# Patient Record
Sex: FEMALE | URBAN - METROPOLITAN AREA
[De-identification: names, ages, dates, MRNs, and addresses within clinical notes are randomized per-mention and may not be internally consistent; named-entity substitution may affect disease eponyms.]

---

## 2024-06-11 LAB
ABO, EXTERNAL RESULT: NORMAL
C. TRACHOMATIS, EXTERNAL RESULT: NEGATIVE
HEPATITIS C ANTIBODY, EXTERNAL RESULT: NON REACTIVE
HIV, EXTERNAL RESULT: NON REACTIVE
N. GONORRHOEAE, EXTERNAL RESULT: NEGATIVE
RH FACTOR, EXTERNAL RESULT: NEGATIVE
RPR, EXTERNAL RESULT: NON REACTIVE
T. PALLIDUM (SYPHILIS) ANTIBODY, EXTERNAL RESULT: NON REACTIVE

## 2024-06-13 ENCOUNTER — TELEPHONE (OUTPATIENT)
Dept: OBGYN CLINIC | Age: 24
End: 2024-06-13

## 2024-06-13 NOTE — TELEPHONE ENCOUNTER
Patient called desiring to transfer care to our office. Patient currently is being seeing at [facility] in [address]. Patient is faxing over her chart.

## 2024-06-24 NOTE — PROGRESS NOTES
NOB consult with pt. Labs today: NIPT, Carrier Testing. Offered pt the option of CF, SMA, and Fragile X carrier testing. Pt desires testing. Offered pt the option of genetic screening (1st screen vs Tetra vs NIPT). Pt desires NIPT. Instructed pt on exercise/nutrition in pregnancy. Reviewed [facility] preg book. Advised pt on using [facility] for hospital needs and [facility] L&D for pregnancy related emergencies. IMPACTT Program discussed with patient and sheet given to her during visit today. Encouraged her to inform us should she start having increase in depression/anxiety. Pt states understanding. NOB forms signed, scanned, and given to pt.   Patient is a transfer from [facility]. Patient's mother is Dyan that works the  at L&D triage at [employer] [facility] [employer] [facility].     Vitals:  Weight: 157 lb  BMI: 26  BP: 106/72    Medical Hx: Endometriosis (2023) diagnosed by laparoscopy. - GERD. - Vitamin D deficiency (2021). - Rh negative.  Anxiety and depression, not on medication, stable.     Surgical Hx: Laparoscopy (2023) diagnostic for endometriosis.    Last Pap: None.    Pt OB c/o: Patient was previously being prescribed Zofran at [facility]. Is out of refills, ordered more.     Fam hx any chromosomal or inheritable disorders: None.     COVID Vaccine: x0 per patient  Flu Vaccine: Discussed flu recommendations with patient. Patient declines flu vaccine.    OB hx: G1: 10/2021, SAB.   G2: 06/2024, Current.     NV: Next appointment scheduled on 7/1 with Dr Rubin.

## 2024-06-27 ENCOUNTER — NURSE ONLY (OUTPATIENT)
Dept: OBGYN CLINIC | Age: 24
End: 2024-06-27

## 2024-06-27 VITALS
WEIGHT: 157 LBS | SYSTOLIC BLOOD PRESSURE: 106 MMHG | HEIGHT: 64 IN | DIASTOLIC BLOOD PRESSURE: 72 MMHG | BODY MASS INDEX: 26.8 KG/M2

## 2024-06-27 DIAGNOSIS — Z3A.10 10 WEEKS GESTATION OF PREGNANCY: Primary | ICD-10-CM

## 2024-06-27 DIAGNOSIS — Z34.91 NORMAL PREGNANCY, FIRST TRIMESTER: ICD-10-CM

## 2024-06-27 RX ORDER — ONDANSETRON 4 MG/1
TABLET, FILM COATED ORAL
Qty: 30 TABLET | Refills: 2 | Status: SHIPPED | OUTPATIENT
Start: 2024-06-27

## 2024-06-27 SDOH — ECONOMIC STABILITY: FOOD INSECURITY: WITHIN THE PAST 12 MONTHS, YOU WORRIED THAT YOUR FOOD WOULD RUN OUT BEFORE YOU GOT MONEY TO BUY MORE.: NEVER TRUE

## 2024-06-27 SDOH — ECONOMIC STABILITY: HOUSING INSECURITY
IN THE LAST 12 MONTHS, WAS THERE A TIME WHEN YOU DID NOT HAVE A STEADY PLACE TO SLEEP OR SLEPT IN A SHELTER (INCLUDING NOW)?: NO

## 2024-06-27 SDOH — ECONOMIC STABILITY: INCOME INSECURITY: HOW HARD IS IT FOR YOU TO PAY FOR THE VERY BASICS LIKE FOOD, HOUSING, MEDICAL CARE, AND HEATING?: NOT HARD AT ALL

## 2024-06-27 SDOH — ECONOMIC STABILITY: FOOD INSECURITY: WITHIN THE PAST 12 MONTHS, THE FOOD YOU BOUGHT JUST DIDN'T LAST AND YOU DIDN'T HAVE MONEY TO GET MORE.: NEVER TRUE

## 2024-07-01 ENCOUNTER — ROUTINE PRENATAL (OUTPATIENT)
Dept: OBGYN CLINIC | Age: 24
End: 2024-07-01
Payer: MEDICAID

## 2024-07-01 VITALS — DIASTOLIC BLOOD PRESSURE: 70 MMHG | WEIGHT: 156 LBS | SYSTOLIC BLOOD PRESSURE: 128 MMHG | BODY MASS INDEX: 26.78 KG/M2

## 2024-07-01 DIAGNOSIS — O09.291 HISTORY OF MISCARRIAGE, CURRENTLY PREGNANT, FIRST TRIMESTER: ICD-10-CM

## 2024-07-01 DIAGNOSIS — Z12.4 SCREENING FOR CERVICAL CANCER: ICD-10-CM

## 2024-07-01 DIAGNOSIS — Z34.81 ENCOUNTER FOR SUPERVISION OF OTHER NORMAL PREGNANCY IN FIRST TRIMESTER: ICD-10-CM

## 2024-07-01 DIAGNOSIS — O21.0 HYPEREMESIS AFFECTING PREGNANCY, ANTEPARTUM: ICD-10-CM

## 2024-07-01 DIAGNOSIS — Z34.91 NORMAL PREGNANCY, FIRST TRIMESTER: Primary | ICD-10-CM

## 2024-07-01 DIAGNOSIS — Z11.3 SCREEN FOR STD (SEXUALLY TRANSMITTED DISEASE): ICD-10-CM

## 2024-07-01 PROCEDURE — 99203 OFFICE O/P NEW LOW 30 MIN: CPT | Performed by: OBSTETRICS & GYNECOLOGY

## 2024-07-01 NOTE — PROGRESS NOTES
Right: No inverted nipple, mass, nipple discharge, skin change or tenderness.         Left: No inverted nipple, mass, nipple discharge, skin change or tenderness.   Abdominal:      General: Bowel sounds are normal. There is no distension.      Palpations: Abdomen is soft. There is no mass.      Tenderness: There is no abdominal tenderness. There is no guarding or rebound.   Genitourinary:     Labia:         Right: No rash, tenderness, lesion or injury.         Left: No rash, tenderness, lesion or injury.       Vagina: Normal. No signs of injury and foreign body. No vaginal discharge, erythema, tenderness or bleeding.      Cervix: No cervical motion tenderness, discharge or friability.      Uterus: Enlarged and not tender.       Adnexa:         Right: No mass, tenderness or fullness.          Left: No mass, tenderness or fullness.        Comments: External genitalia are normal in appearance.    Examination of urethral meatus reveals location normal and size normal.   Examination of urethra shows no abnormalities.   Examination of vaginal vault reveals no abnormalities.   Cervix is long and closed without visible pathology.   Pap smear collected.  Uterine portion of bimanual exam reveals contour normal, shape regular, descensus absent, no nodularity, no tenderness and size 11 weeks GA.    Adnexa and parametria exam reveals no masses, nontender.    Visual examination of anus and perineum shows no abnormalities.  Musculoskeletal: Normal range of motion.         General: No tenderness.   Lymphadenopathy:      Cervical: No cervical adenopathy.      Upper Body:      Right upper body: No supraclavicular adenopathy.      Left upper body: No supraclavicular adenopathy.      Lower Body: No right inguinal adenopathy. No left inguinal adenopathy.   Skin:     General: Skin is warm and dry.      Coloration: Skin is not pale.      Findings: No erythema or rash.   Neurological:      Mental Status: She is alert and oriented to

## 2024-07-04 LAB
C TRACH RRNA CVX QL NAA+PROBE: NEGATIVE
COLLECTION METHOD: NORMAL
CYTOLOGIST CVX/VAG CYTO: NORMAL
CYTOLOGY CVX/VAG DOC THIN PREP: NORMAL
HPV REFLEX: NORMAL
Lab: NORMAL
N GONORRHOEA RRNA CVX QL NAA+PROBE: NEGATIVE
PAP SOURCE: NORMAL
PATH REPORT.FINAL DX SPEC: NORMAL
STAT OF ADQ CVX/VAG CYTO-IMP: NORMAL
T VAGINALIS RRNA SPEC QL NAA+PROBE: NEGATIVE

## 2024-07-05 LAB
Lab: NORMAL
NTRA 22Q11.2 DELETION SYNDROME POPULATION-BASED RISK TEXT: NORMAL
NTRA 22Q11.2 DELETION SYNDROME RESULT TEXT: NORMAL
NTRA 22Q11.2 DELETION SYNDROME RISK SCORE TEXT: NORMAL
NTRA FETAL FRACTION: NORMAL
NTRA FETAL RHD SUMMARY: NORMAL
NTRA GENDER OF FETUS: NORMAL
NTRA MONOSOMY X AGE-BASED RISK TEXT: NORMAL
NTRA MONOSOMY X RESULT TEXT: NORMAL
NTRA MONOSOMY X RISK SCORE TEXT: NORMAL
NTRA TRIPLOIDY RESULT TEXT: NORMAL
NTRA TRISOMY 13 AGE-BASED RISK TEXT: NORMAL
NTRA TRISOMY 13 RESULT TEXT: NORMAL
NTRA TRISOMY 13 RISK SCORE TEXT: NORMAL
NTRA TRISOMY 18 AGE-BASED RISK TEXT: NORMAL
NTRA TRISOMY 18 RESULT TEXT: NORMAL
NTRA TRISOMY 18 RISK SCORE TEXT: NORMAL
NTRA TRISOMY 21 AGE-BASED RISK TEXT: NORMAL
NTRA TRISOMY 21 RESULT TEXT: NORMAL
NTRA TRISOMY 21 RISK SCORE TEXT: NORMAL

## 2024-07-05 RX ORDER — ONDANSETRON 4 MG/1
TABLET, FILM COATED ORAL
Qty: 30 TABLET | Refills: 2 | Status: SHIPPED | OUTPATIENT
Start: 2024-07-05

## 2024-07-16 ENCOUNTER — TELEPHONE (OUTPATIENT)
Dept: OBGYN CLINIC | Age: 24
End: 2024-07-16

## 2024-07-16 PROBLEM — O09.291 HISTORY OF MISCARRIAGE, CURRENTLY PREGNANT, FIRST TRIMESTER: Status: ACTIVE | Noted: 2024-07-16

## 2024-07-16 PROBLEM — O21.0 HYPEREMESIS AFFECTING PREGNANCY, ANTEPARTUM: Status: ACTIVE | Noted: 2024-07-16

## 2024-07-16 PROBLEM — Z34.90 SUPERVISION OF NORMAL PREGNANCY: Status: ACTIVE | Noted: 2024-07-16

## 2024-07-16 RX ORDER — ONDANSETRON 4 MG/1
TABLET, FILM COATED ORAL
Qty: 30 TABLET | Refills: 2 | OUTPATIENT
Start: 2024-07-16

## 2024-07-16 RX ORDER — PRENATAL VIT 75/IRON/FOLIC/OM3 28-800-440
1 COMBINATION PACKAGE (EA) ORAL DAILY
Qty: 120 EACH | Refills: 3 | Status: SHIPPED | OUTPATIENT
Start: 2024-07-16

## 2024-07-16 NOTE — TELEPHONE ENCOUNTER
Patient called OB line stating she has had nausea and vomiting. Informed patient if she cannot eat or drink for more than 24 hours to go to [facility]. Patient verbalized understanding.

## 2024-07-29 ENCOUNTER — ROUTINE PRENATAL (OUTPATIENT)
Dept: OBGYN CLINIC | Age: 24
End: 2024-07-29
Payer: MEDICAID

## 2024-07-29 VITALS — BODY MASS INDEX: 25.75 KG/M2 | DIASTOLIC BLOOD PRESSURE: 80 MMHG | SYSTOLIC BLOOD PRESSURE: 138 MMHG | WEIGHT: 150 LBS

## 2024-07-29 DIAGNOSIS — O21.0 HYPEREMESIS AFFECTING PREGNANCY, ANTEPARTUM: ICD-10-CM

## 2024-07-29 DIAGNOSIS — O09.292 HISTORY OF MISCARRIAGE, CURRENTLY PREGNANT, SECOND TRIMESTER: ICD-10-CM

## 2024-07-29 DIAGNOSIS — Z34.92 NORMAL PREGNANCY IN SECOND TRIMESTER: Primary | ICD-10-CM

## 2024-07-29 PROCEDURE — 99213 OFFICE O/P EST LOW 20 MIN: CPT | Performed by: OBSTETRICS & GYNECOLOGY

## 2024-07-29 RX ORDER — ONDANSETRON 4 MG/1
TABLET, FILM COATED ORAL
Qty: 60 TABLET | Refills: 0 | Status: SHIPPED | OUTPATIENT
Start: 2024-07-29

## 2024-07-29 RX ORDER — ONDANSETRON 8 MG/1
TABLET, ORALLY DISINTEGRATING ORAL
COMMUNITY
Start: 2024-07-16

## 2024-07-29 NOTE — PROGRESS NOTES
Smokeless tobacco: Never   Substance and Sexual Activity    Alcohol use: Not Currently    Drug use: Never    Sexual activity: Yes     Partners: Male   Other Topics Concern    Not on file   Social History Narrative    Not on file     Social Determinants of Health     Financial Resource Strain: Low Risk  (6/27/2024)    Overall Financial Resource Strain (CARDIA)     Difficulty of Paying Living Expenses: Not hard at all   Food Insecurity: No Food Insecurity (6/27/2024)    Hunger Vital Sign     Worried About Running Out of Food in the Last Year: Never true     Ran Out of Food in the Last Year: Never true   Transportation Needs: Unknown (6/27/2024)    PRAPARE - Transportation     Lack of Transportation (Medical): Not on file     Lack of Transportation (Non-Medical): No   Physical Activity: Not on file   Stress: Not on file   Social Connections: Not on file   Intimate Partner Violence: Not on file   Housing Stability: Unknown (6/27/2024)    Housing Stability Vital Sign     Unable to Pay for Housing in the Last Year: Not on file     Number of Places Lived in the Last Year: Not on file     Unstable Housing in the Last Year: No     No family history on file.  /80   Wt 68 kg (150 lb)   LMP 04/09/2024 (Exact Date)   BMI 25.75 kg/m²        7/29/2024    10:12 AM 7/1/2024     3:04 PM 6/27/2024    10:34 AM   Weight Metrics   Weight 150 lb 156 lb 157 lb   BMI (Calculated) 0 kg/m2 0 kg/m2 27 kg/m2     FHTs 160s    1. Normal pregnancy in second trimester    2. Hyperemesis affecting pregnancy, antepartum    3. History of miscarriage, currently pregnant, second trimester      Orders Placed This Encounter   Procedures    TSH    T4, Free    [facility] - Hallie Li MD, Maternal & Fetal Medicine, [place]     Orders Placed This Encounter   Medications    ondansetron (ZOFRAN) 4 MG tablet     Sig: One to Two tablets TID prn nausea and vomiting / hyperemesis.     Dispense:  60 tablet     Refill:  0     Discussed her symptoms. She has

## 2024-08-21 ENCOUNTER — LAB (OUTPATIENT)
Dept: OBGYN CLINIC | Age: 24
End: 2024-08-21

## 2024-08-21 DIAGNOSIS — O21.0 HYPEREMESIS AFFECTING PREGNANCY, ANTEPARTUM: ICD-10-CM

## 2024-08-21 DIAGNOSIS — Z34.92 NORMAL PREGNANCY IN SECOND TRIMESTER: ICD-10-CM

## 2024-08-21 LAB
T4 FREE SERPL-MCNC: 1 NG/DL (ref 0.9–1.7)
TSH, 3RD GENERATION: 1.77 UIU/ML (ref 0.27–4.2)

## 2024-08-25 DIAGNOSIS — O21.0 HYPEREMESIS AFFECTING PREGNANCY, ANTEPARTUM: ICD-10-CM

## 2024-08-26 RX ORDER — ONDANSETRON 4 MG/1
TABLET, FILM COATED ORAL
Qty: 60 TABLET | Refills: 0 | Status: SHIPPED | OUTPATIENT
Start: 2024-08-26

## 2024-08-28 PROBLEM — O09.92 HIGH-RISK PREGNANCY IN SECOND TRIMESTER: Status: ACTIVE | Noted: 2024-07-16

## 2024-08-28 PROBLEM — O99.340 MENTAL DISORDER AFFECTING PREGNANCY: Status: ACTIVE | Noted: 2024-08-28

## 2024-08-30 ENCOUNTER — ROUTINE PRENATAL (OUTPATIENT)
Dept: OBGYN CLINIC | Age: 24
End: 2024-08-30
Payer: MEDICAID

## 2024-08-30 VITALS — DIASTOLIC BLOOD PRESSURE: 76 MMHG | BODY MASS INDEX: 27.46 KG/M2 | WEIGHT: 160 LBS | SYSTOLIC BLOOD PRESSURE: 124 MMHG

## 2024-08-30 DIAGNOSIS — O21.0 HYPEREMESIS AFFECTING PREGNANCY, ANTEPARTUM: ICD-10-CM

## 2024-08-30 DIAGNOSIS — O09.292 HISTORY OF MISCARRIAGE, CURRENTLY PREGNANT, SECOND TRIMESTER: ICD-10-CM

## 2024-08-30 DIAGNOSIS — Z34.92 NORMAL PREGNANCY IN SECOND TRIMESTER: Primary | ICD-10-CM

## 2024-08-30 PROCEDURE — 99213 OFFICE O/P EST LOW 20 MIN: CPT | Performed by: OBSTETRICS & GYNECOLOGY

## 2024-09-02 DIAGNOSIS — O21.0 HYPEREMESIS AFFECTING PREGNANCY, ANTEPARTUM: ICD-10-CM

## 2024-09-02 RX ORDER — ONDANSETRON 4 MG/1
TABLET, FILM COATED ORAL
Qty: 60 TABLET | Refills: 0 | Status: CANCELLED | OUTPATIENT
Start: 2024-09-02

## 2024-09-03 DIAGNOSIS — O21.0 HYPEREMESIS AFFECTING PREGNANCY, ANTEPARTUM: ICD-10-CM

## 2024-09-03 RX ORDER — ONDANSETRON 4 MG/1
TABLET, FILM COATED ORAL
Qty: 60 TABLET | Refills: 0 | Status: SHIPPED | OUTPATIENT
Start: 2024-09-03

## 2024-09-04 ENCOUNTER — ROUTINE PRENATAL (OUTPATIENT)
Dept: OBGYN CLINIC | Age: 24
End: 2024-09-04
Payer: MEDICAID

## 2024-09-04 VITALS — SYSTOLIC BLOOD PRESSURE: 120 MMHG | DIASTOLIC BLOOD PRESSURE: 76 MMHG

## 2024-09-04 DIAGNOSIS — O09.92 HIGH-RISK PREGNANCY IN SECOND TRIMESTER: Primary | ICD-10-CM

## 2024-09-04 DIAGNOSIS — O99.342 MENTAL DISORDER AFFECTING PREGNANCY IN SECOND TRIMESTER: ICD-10-CM

## 2024-09-04 DIAGNOSIS — O09.292 HISTORY OF MISCARRIAGE, CURRENTLY PREGNANT, SECOND TRIMESTER: ICD-10-CM

## 2024-09-04 DIAGNOSIS — Z3A.20 20 WEEKS GESTATION OF PREGNANCY: ICD-10-CM

## 2024-09-04 DIAGNOSIS — O21.0 HYPEREMESIS AFFECTING PREGNANCY, ANTEPARTUM: ICD-10-CM

## 2024-09-04 PROCEDURE — 99214 OFFICE O/P EST MOD 30 MIN: CPT | Performed by: OBSTETRICS & GYNECOLOGY

## 2024-09-04 PROCEDURE — 76827 ECHO EXAM OF FETAL HEART: CPT | Performed by: OBSTETRICS & GYNECOLOGY

## 2024-09-04 PROCEDURE — 76811 OB US DETAILED SNGL FETUS: CPT | Performed by: OBSTETRICS & GYNECOLOGY

## 2024-09-04 PROCEDURE — 93325 DOPPLER ECHO COLOR FLOW MAPG: CPT | Performed by: OBSTETRICS & GYNECOLOGY

## 2024-09-04 PROCEDURE — 76825 ECHO EXAM OF FETAL HEART: CPT | Performed by: OBSTETRICS & GYNECOLOGY

## 2024-09-04 RX ORDER — FAMOTIDINE 20 MG/1
20 TABLET, FILM COATED ORAL 2 TIMES DAILY
COMMUNITY

## 2024-09-04 ASSESSMENT — PATIENT HEALTH QUESTIONNAIRE - PHQ9
SUM OF ALL RESPONSES TO PHQ QUESTIONS 1-9: 0
SUM OF ALL RESPONSES TO PHQ9 QUESTIONS 1 & 2: 0
1. LITTLE INTEREST OR PLEASURE IN DOING THINGS: NOT AT ALL
SUM OF ALL RESPONSES TO PHQ QUESTIONS 1-9: 0
2. FEELING DOWN, DEPRESSED OR HOPELESS: NOT AT ALL
SUM OF ALL RESPONSES TO PHQ QUESTIONS 1-9: 0
SUM OF ALL RESPONSES TO PHQ QUESTIONS 1-9: 0

## 2024-09-04 NOTE — PROGRESS NOTES
[facility] MATERNAL FETAL MEDICINE    [address] [place] [address]  P- [phone]  [phone]         MFM Consultation    Presents for evaluation of the following chief complaint(s):   Chief Complaint   Patient presents with    High Risk Pregnancy     Hyperemesis Gravidarum     Pregnancy Ultrasound     Anatomy and echo         Pau Hester (2000) is a 24 y.o.  at 20w2d with 2025, by Ultrasound.     Patient is not working outside of home.  Support person, Calin, present today. Expecting baby boy, Federico.  Personal and family history reviewed and updated as indicated.   Records from pregnancy reviewed. Chart updated to portray current issues.     [facility].  No HAs, edema.  Denies preeclamptic symptoms.  Reports good fetal movement.  No bleeding, LOF, cramping, ctxs, or vaginal pressure.        Mood evaluated today based on discussion with pt and PHQ screen.      2024    10:59 AM   PHQ-9    Little interest or pleasure in doing things 0   Feeling down, depressed, or hopeless 0   PHQ-2 Score 0   PHQ-9 Total Score 0      Mood Reassuring today    Addressed normal pregnancy complaints, reassured and offered suggestions for care  Reviewed gestational age precautions and activity goals/limitations  Nutritional counseling as well as specific goals based on current maternal and fetal status  Options for GERD, constipation, other common complaints reviewed.   Reviewed gestational age appropriate preventive care regarding communicable disease transmission and vaccines as appropriate (including flu, TDaP >28wk, RSV 32-36wk, and COVID.)  Mood counseling today      Vitals:    24 1056   BP: 120/76      Physical Exam  Applicable labs reviewed.   Please see formal ultrasound report under imaging tab.      ASSESSMENT/PLAN:  Patient Active Problem List    Diagnosis Date Noted    Anxiety and Depression affecting pregnancy 2024     Overview

## 2024-09-04 NOTE — PATIENT INSTRUCTIONS
Your Maternity Check List   Before Arriving to the Hospital     Find an OBGYN Doctor: [email or web address]  Maternity Pre-registration for Hospital: [email or web address]  Sign up for a Hospital Tour: www.[email or web address]/about-us/classes-events  [place] for Prenatal Classes: www.[email or web address]/about-us/classes-events   Car seat Safety Check: [email or web address]/coalition/[email or web address]   Learn More: www.[employer]/health-care-services/womens-health/maternity   Download the [place] Pregnancy Joselo: (Scan QR Code below):  See educational videos, track your pregnancy, and Mom/Baby Care videos          Resources for Depression/Anxiety  Postpartum Support International (PSI).    PSI Warmline:  5-956-028-4PPD (4768).  WWW.POSTPARTUM.NET    Mom's IMPACTT  [email or web address]       In order to optimize maternal, fetal, and  health, we recommend the following vaccinations.   Flu- yearly (https://www.highriskpregnancyinfo.org/flu-facts-for-pregnancy)  Consider Covid vaccination/booster (https://www.highriskpregnancyinfo.org/covid-19-pregnancy)  TDaP after 28 weeks each pregnancy (https://www.highriskpregnancyinfo.org/tdap)  Consider RSV vaccine 32-36 weeks of pregnancy, if Sept- January.  (https://www.highriskpregnancyinfo.org/rsv)

## 2024-09-04 NOTE — ASSESSMENT & PLAN NOTE
Low dose Aspirin  mg daily is recommended to be started at 12-16 weeks (some benefit seen with starting up to 28 weeks) for the prevention of preeclampsia  in high risk women. Consider stopping Aspirin at 36 weeks.  Recommend Vitamin D 2000IU daily and Calcium 1000mg daily to aid in protection of bones and teeth.  Recommend use of PNV daily with well-balanced diet.  Unless instructed otherwise, recommend continuation of physical activity throughout pregnancy.       Genetic counseling was performed by physician after reviewing patient's genetic history.    The patient's Down syndrome age associated risk, as well as, risks of additional aneuploidy and genetic syndromes, are reduced by approximately 50% with a normal anatomy ultrasound. Ultrasound alone does not rule out all abnormalities of genetics and development.     Maternal serum screening for aneuploidy was discussed with the patient including first trimester CAM-A/hCG, second trimester Quad screen (either in isolation or sequential with CAM-A) as well as non-invasive prenatal testing (NIPT) for aneuploidy from a maternal blood sample.  Positive predictive and negative predictive values for these tests were explained, questions answered. Patient understands that these are screening tests that only assesses risk for select abnormalities (trisomies 13, 18, and 21, and sex chromosome abnormalities (NIPT), as well as markers for placental health (CAM-A) and risk for open neural tube defects (quad)).  NIPT is designed for high risk populations, but should be considered by all patients who desire the current best option for screening for applicable genetic abnormalities.     Limitations of technology discussed based on maternal age, technical aspects of tests, and maternal BMI reviewed.  All questions answered and concerns discussed.     Patient elected to proceed with NIPT previously at OB office- results low risk.

## 2024-09-04 NOTE — ASSESSMENT & PLAN NOTE
Anxiety and Depression  The approach to depression and anxiety in pregnancy must look at the whole maternal-child cohort to assess risks and benefits.  depression is associated with an increased risk of multiple poor obstetrical outcomes, including spontaneous , bleeding, operative deliveries, and  birth. In a nationally representative survey in the [place] that identified pregnant women with major depression, only 50 percent received treatment. Untreated disease causes maternal suffering and is associated with poor nutrition, comorbid substance use disorders, poor adherence with prenatal care, postpartum depression, impaired relationships between the mother and her infant and other family members, and an increased risk of suicide.    It is important to assess the benefit of previous treatment in order to guide treatment selection.  If psychotherapy is indicated and the patient was successfully treated with a particular psychotherapy prior to pregnancy, the same therapy is used during pregnancy.   Similarly, if pharmacotherapy is indicated and the patient was successfully treated with a particular antidepressant prior to pregnancy, the same drug is used during pregnancy.    The risks of untreated moderate to severe maternal major depression, to both the mother and fetus, often outweigh the risks associated with antidepressants.     A national registry study (nearly 1,300,000 births) compared infants who were exposed to SSRIs in early pregnancy (n >10,000) with infants not exposed. After adjusting for potential confounds (eg, maternal age, smoking, and body mass index), the analyses found that the risk of severe congenital malformations was comparable in the two groups.    Antidepressant drug doses may need to be increased as the pregnancy progresses, especially during the third trimester. There is no evidence that tapering or discontinuing antidepressants at term reduces the

## 2024-09-10 DIAGNOSIS — O21.0 HYPEREMESIS AFFECTING PREGNANCY, ANTEPARTUM: ICD-10-CM

## 2024-09-10 RX ORDER — ONDANSETRON 4 MG/1
TABLET, FILM COATED ORAL
Qty: 60 TABLET | Refills: 0 | Status: SHIPPED | OUTPATIENT
Start: 2024-09-10

## 2024-09-23 ENCOUNTER — ROUTINE PRENATAL (OUTPATIENT)
Dept: OBGYN CLINIC | Age: 24
End: 2024-09-23

## 2024-09-23 VITALS — SYSTOLIC BLOOD PRESSURE: 124 MMHG | BODY MASS INDEX: 27.64 KG/M2 | WEIGHT: 161 LBS | DIASTOLIC BLOOD PRESSURE: 78 MMHG

## 2024-09-23 DIAGNOSIS — O21.0 HYPEREMESIS AFFECTING PREGNANCY, ANTEPARTUM: ICD-10-CM

## 2024-09-23 DIAGNOSIS — O99.342 MENTAL DISORDER AFFECTING PREGNANCY IN SECOND TRIMESTER: ICD-10-CM

## 2024-09-23 DIAGNOSIS — O09.291 HISTORY OF MISCARRIAGE, CURRENTLY PREGNANT, FIRST TRIMESTER: ICD-10-CM

## 2024-09-23 DIAGNOSIS — O09.92 HIGH-RISK PREGNANCY IN SECOND TRIMESTER: Primary | ICD-10-CM

## 2024-09-23 NOTE — PROGRESS NOTES
Patient presents today for   Chief Complaint   Patient presents with    Routine Prenatal Visit     Transfer of care from [facility].     Hyperemesis improving some, off and on.    No VB, no LOF, +FM.       Allergies   Allergen Reactions    Cephalexin Itching     Rash to hands     Current Outpatient Medications   Medication Sig Dispense Refill    Ferrous Sulfate (IRON PO) Take by mouth      Fish Oil-Cholecalciferol (OMEGA-3 + D PO) Take by mouth      famotidine (PEPCID) 20 MG tablet Take 1 tablet by mouth 2 times daily      Prenatal Vit-Fe Fum-FA-Omega (ONE-A-DAY WOMENS PRENATAL) 28-0.8 & 440 MG MISC Take 1 tablet by mouth daily 120 each 3    ondansetron (ZOFRAN) 4 MG tablet One to Two tablets TID prn nausea and vomiting / hyperemesis. (Patient not taking: Reported on 9/23/2024) 60 tablet 0    ondansetron (ZOFRAN-ODT) 8 MG TBDP disintegrating tablet  (Patient not taking: Reported on 9/23/2024)       No current facility-administered medications for this visit.     Past Medical History:   Diagnosis Date    Anxiety and depression     not on medication, stable    Endometriosis 2023    diagnosed by laparoscopy    GERD (gastroesophageal reflux disease)     Rh incompatibility     Vitamin D deficiency 2021     Past Surgical History:   Procedure Laterality Date    CHOLECYSTECTOMY  2023    LAPAROSCOPY  2023    x2     Social History     Socioeconomic History    Marital status:      Spouse name: Not on file    Number of children: Not on file    Years of education: Not on file    Highest education level: Not on file   Occupational History    Not on file   Tobacco Use    Smoking status: Never    Smokeless tobacco: Never   Vaping Use    Vaping status: Never Used   Substance and Sexual Activity    Alcohol use: Never    Drug use: Never    Sexual activity: Yes     Partners: Male   Other Topics Concern    Not on file   Social History Narrative    Not on file     Social Determinants of Health     Financial Resource Strain: Low Risk

## 2024-10-14 ENCOUNTER — TELEPHONE (OUTPATIENT)
Dept: OBGYN CLINIC | Age: 24
End: 2024-10-14

## 2024-10-14 PROBLEM — Z14.1 CYSTIC FIBROSIS CARRIER: Status: ACTIVE | Noted: 2024-10-14

## 2024-10-14 NOTE — TELEPHONE ENCOUNTER
Patient returned call to discuss results. Father of the baby, Renzo Hester, scheduled for lab draw on 10/23.

## 2024-10-23 ENCOUNTER — ROUTINE PRENATAL (OUTPATIENT)
Dept: OBGYN CLINIC | Age: 24
End: 2024-10-23
Payer: MEDICAID

## 2024-10-23 VITALS — WEIGHT: 163 LBS | DIASTOLIC BLOOD PRESSURE: 72 MMHG | BODY MASS INDEX: 27.98 KG/M2 | SYSTOLIC BLOOD PRESSURE: 124 MMHG

## 2024-10-23 DIAGNOSIS — Z13.1 SCREENING FOR DIABETES MELLITUS: Primary | ICD-10-CM

## 2024-10-23 DIAGNOSIS — O21.0 HYPEREMESIS AFFECTING PREGNANCY, ANTEPARTUM: Primary | ICD-10-CM

## 2024-10-23 DIAGNOSIS — O99.342 MENTAL DISORDER AFFECTING PREGNANCY IN SECOND TRIMESTER: ICD-10-CM

## 2024-10-23 DIAGNOSIS — O09.291 HISTORY OF MISCARRIAGE, CURRENTLY PREGNANT, FIRST TRIMESTER: ICD-10-CM

## 2024-10-23 DIAGNOSIS — O09.92 HIGH-RISK PREGNANCY IN SECOND TRIMESTER: ICD-10-CM

## 2024-10-23 DIAGNOSIS — Z14.1 CYSTIC FIBROSIS CARRIER: ICD-10-CM

## 2024-10-23 LAB — GLUCOSE 1 HOUR: 110 MG/DL

## 2024-10-23 PROCEDURE — 99213 OFFICE O/P EST LOW 20 MIN: CPT | Performed by: OBSTETRICS & GYNECOLOGY

## 2024-10-23 NOTE — PROGRESS NOTES
Patient presents today for   Chief Complaint   Patient presents with    Routine Prenatal Visit     Transfer of care from [facility]. Had hyperemesis that has mostly improved but occasionally yo yos.    No VB, no LOF, +FM.       Allergies   Allergen Reactions    Cephalexin Itching     Rash to hands     Current Outpatient Medications   Medication Sig Dispense Refill    Ferrous Sulfate (IRON PO) Take by mouth      Fish Oil-Cholecalciferol (OMEGA-3 + D PO) Take by mouth      famotidine (PEPCID) 20 MG tablet Take 1 tablet by mouth 2 times daily      Prenatal Vit-Fe Fum-FA-Omega (ONE-A-DAY WOMENS PRENATAL) 28-0.8 & 440 MG MISC Take 1 tablet by mouth daily 120 each 3     No current facility-administered medications for this visit.     Past Medical History:   Diagnosis Date    Anxiety and depression     not on medication, stable    Endometriosis 2023    diagnosed by laparoscopy    GERD (gastroesophageal reflux disease)     Rh incompatibility     Vitamin D deficiency 2021     Past Surgical History:   Procedure Laterality Date    CHOLECYSTECTOMY  2023    LAPAROSCOPY  2023    x2     Social History     Socioeconomic History    Marital status:      Spouse name: Not on file    Number of children: Not on file    Years of education: Not on file    Highest education level: Not on file   Occupational History    Not on file   Tobacco Use    Smoking status: Never    Smokeless tobacco: Never   Vaping Use    Vaping status: Never Used   Substance and Sexual Activity    Alcohol use: Never    Drug use: Never    Sexual activity: Yes     Partners: Male   Other Topics Concern    Not on file   Social History Narrative    Not on file     Social Determinants of Health     Financial Resource Strain: Low Risk  (6/27/2024)    Overall Financial Resource Strain (CARDIA)     Difficulty of Paying Living Expenses: Not hard at all   Food Insecurity: No Food Insecurity (6/27/2024)    Hunger Vital Sign     Worried About Running Out of Food in the Last

## 2024-10-31 ENCOUNTER — ROUTINE PRENATAL (OUTPATIENT)
Dept: OBGYN CLINIC | Age: 24
End: 2024-10-31

## 2024-10-31 ENCOUNTER — CLINICAL DOCUMENTATION (OUTPATIENT)
Dept: OBGYN CLINIC | Age: 24
End: 2024-10-31

## 2024-10-31 VITALS — HEART RATE: 111 BPM | DIASTOLIC BLOOD PRESSURE: 84 MMHG | SYSTOLIC BLOOD PRESSURE: 130 MMHG

## 2024-10-31 DIAGNOSIS — O21.0 HYPEREMESIS AFFECTING PREGNANCY, ANTEPARTUM: ICD-10-CM

## 2024-10-31 DIAGNOSIS — O09.293 HISTORY OF MISCARRIAGE, CURRENTLY PREGNANT, THIRD TRIMESTER: ICD-10-CM

## 2024-10-31 DIAGNOSIS — R03.0 SINGLE EPISODE OF ELEVATED BLOOD PRESSURE: ICD-10-CM

## 2024-10-31 DIAGNOSIS — Z14.1 CYSTIC FIBROSIS CARRIER: ICD-10-CM

## 2024-10-31 DIAGNOSIS — O09.93 HIGH-RISK PREGNANCY IN THIRD TRIMESTER: Primary | ICD-10-CM

## 2024-10-31 DIAGNOSIS — O99.343 MENTAL DISORDER AFFECTING PREGNANCY IN THIRD TRIMESTER: ICD-10-CM

## 2024-10-31 DIAGNOSIS — Z3A.28 28 WEEKS GESTATION OF PREGNANCY: ICD-10-CM

## 2024-10-31 ASSESSMENT — PATIENT HEALTH QUESTIONNAIRE - PHQ9
2. FEELING DOWN, DEPRESSED OR HOPELESS: NOT AT ALL
SUM OF ALL RESPONSES TO PHQ QUESTIONS 1-9: 0
SUM OF ALL RESPONSES TO PHQ9 QUESTIONS 1 & 2: 0
SUM OF ALL RESPONSES TO PHQ QUESTIONS 1-9: 0
SUM OF ALL RESPONSES TO PHQ QUESTIONS 1-9: 0
1. LITTLE INTEREST OR PLEASURE IN DOING THINGS: NOT AT ALL
SUM OF ALL RESPONSES TO PHQ QUESTIONS 1-9: 0

## 2024-10-31 NOTE — PROGRESS NOTES
[facility] MATERNAL FETAL MEDICINE    [address] [place] [address]  P- [phone]  [phone]       MFM Follow-up Visit  Pau Hester (2000) is a 24 y.o.  at 28w3d with 2025, by Ultrasound.   Presents for evaluation of the following chief complaint(s):   Chief Complaint   Patient presents with    Ultrasound    High Risk Pregnancy       Hyperemesis Gravidarum          Patient is not working outside of home.    Support person, Calin, and patient's mother present for appointment today    Expecting baby boy, Federico.    Personal and family history reviewed and updated as indicated.   Records from pregnancy reviewed. Chart updated to portray current issues.      [facility]    Interval history since prior appt reviewed and chart updated as indicated.      No HAs, edema.  Denies preeclamptic symptoms.  Reports good fetal movement.  No bleeding, LOF, cramping, ctxs, or vaginal pressure.        Taking Pepcid 20 mg q hs which is working well     Mood evaluated today based on discussion with pt and PHQ screen.       10/31/2024     8:49 AM   PHQ-9    Little interest or pleasure in doing things 0   Feeling down, depressed, or hopeless 0   PHQ-2 Score 0   PHQ-9 Total Score 0      Mood Reassuring today  Recommend lifestyle/behavioral modifications to enhance mood (exercise, sunshine, mood apps, nutritional modifications, etc).     Addressed normal pregnancy complaints, reassured and offered suggestions for care  Reviewed gestational age precautions and activity goals/limitations  Nutritional counseling as well as specific goals based on current maternal and fetal status  Options for GERD, constipation, other common complaints reviewed.   Reviewed gestational age appropriate preventive care regarding communicable disease transmission and vaccines as appropriate (including flu, TDaP >28wk, RSV 32-36wk, and COVID.)    Exam:     Vitals:    10/31/24 0848

## 2024-10-31 NOTE — PROGRESS NOTES
Per MFM:  Dr López would like her to have CBC, CMP, PCR collected at her next appointment on 11/11

## 2024-11-07 DIAGNOSIS — O26.893 RH NEGATIVE STATE IN ANTEPARTUM PERIOD, THIRD TRIMESTER: ICD-10-CM

## 2024-11-07 DIAGNOSIS — O21.0 HYPEREMESIS AFFECTING PREGNANCY, ANTEPARTUM: ICD-10-CM

## 2024-11-07 DIAGNOSIS — Z14.1 CYSTIC FIBROSIS CARRIER: ICD-10-CM

## 2024-11-07 DIAGNOSIS — O09.93 HIGH-RISK PREGNANCY IN THIRD TRIMESTER: Primary | ICD-10-CM

## 2024-11-07 DIAGNOSIS — Z67.91 RH NEGATIVE STATE IN ANTEPARTUM PERIOD, THIRD TRIMESTER: ICD-10-CM

## 2024-11-11 ENCOUNTER — LAB (OUTPATIENT)
Dept: OBGYN CLINIC | Age: 24
End: 2024-11-11

## 2024-11-11 ENCOUNTER — ROUTINE PRENATAL (OUTPATIENT)
Dept: OBGYN CLINIC | Age: 24
End: 2024-11-11
Payer: MEDICAID

## 2024-11-11 VITALS — BODY MASS INDEX: 27.98 KG/M2 | DIASTOLIC BLOOD PRESSURE: 78 MMHG | WEIGHT: 163 LBS | SYSTOLIC BLOOD PRESSURE: 124 MMHG

## 2024-11-11 DIAGNOSIS — O26.893 RH NEGATIVE STATUS DURING PREGNANCY IN THIRD TRIMESTER: ICD-10-CM

## 2024-11-11 DIAGNOSIS — Z14.1 CYSTIC FIBROSIS CARRIER: ICD-10-CM

## 2024-11-11 DIAGNOSIS — Z3A.30 30 WEEKS GESTATION OF PREGNANCY: ICD-10-CM

## 2024-11-11 DIAGNOSIS — Z67.91 RH NEGATIVE STATE IN ANTEPARTUM PERIOD, THIRD TRIMESTER: ICD-10-CM

## 2024-11-11 DIAGNOSIS — Z11.3 SCREEN FOR STD (SEXUALLY TRANSMITTED DISEASE): ICD-10-CM

## 2024-11-11 DIAGNOSIS — O09.93 HIGH-RISK PREGNANCY IN THIRD TRIMESTER: ICD-10-CM

## 2024-11-11 DIAGNOSIS — O26.893 RH NEGATIVE STATE IN ANTEPARTUM PERIOD, THIRD TRIMESTER: ICD-10-CM

## 2024-11-11 DIAGNOSIS — Z67.91 RH NEGATIVE STATUS DURING PREGNANCY IN THIRD TRIMESTER: ICD-10-CM

## 2024-11-11 DIAGNOSIS — O21.0 HYPEREMESIS AFFECTING PREGNANCY, ANTEPARTUM: ICD-10-CM

## 2024-11-11 DIAGNOSIS — O09.93 HIGH-RISK PREGNANCY IN THIRD TRIMESTER: Primary | ICD-10-CM

## 2024-11-11 DIAGNOSIS — Z13.0 SCREENING FOR DEFICIENCY ANEMIA: ICD-10-CM

## 2024-11-11 LAB
BASOPHILS # BLD: 0.1 K/UL (ref 0–0.2)
BASOPHILS NFR BLD: 0 % (ref 0–2)
BLOOD GROUP ANTIBODIES SERPL: NORMAL
CREAT UR-MCNC: 80.6 MG/DL (ref 28–217)
DIFFERENTIAL METHOD BLD: ABNORMAL
EOSINOPHIL # BLD: 0.1 K/UL (ref 0–0.8)
EOSINOPHIL NFR BLD: 1 % (ref 0.5–7.8)
ERYTHROCYTE [DISTWIDTH] IN BLOOD BY AUTOMATED COUNT: 13.2 % (ref 11.9–14.6)
HCT VFR BLD AUTO: 33.4 % (ref 35.8–46.3)
HGB BLD-MCNC: 11.6 G/DL (ref 11.7–15.4)
IMM GRANULOCYTES # BLD AUTO: 0.2 K/UL (ref 0–0.5)
IMM GRANULOCYTES NFR BLD AUTO: 1 % (ref 0–5)
LYMPHOCYTES # BLD: 1.3 K/UL (ref 0.5–4.6)
LYMPHOCYTES NFR BLD: 10 % (ref 13–44)
MCH RBC QN AUTO: 32 PG (ref 26.1–32.9)
MCHC RBC AUTO-ENTMCNC: 34.7 G/DL (ref 31.4–35)
MCV RBC AUTO: 92 FL (ref 82–102)
MONOCYTES # BLD: 0.8 K/UL (ref 0.1–1.3)
MONOCYTES NFR BLD: 6 % (ref 4–12)
NEUTS SEG # BLD: 11.2 K/UL (ref 1.7–8.2)
NEUTS SEG NFR BLD: 82 % (ref 43–78)
NRBC # BLD: 0 K/UL (ref 0–0.2)
PLATELET # BLD AUTO: 285 K/UL (ref 150–450)
PMV BLD AUTO: 10 FL (ref 9.4–12.3)
PROT UR-MCNC: 8 MG/DL
PROT/CREAT UR-RTO: 0.1
RBC # BLD AUTO: 3.63 M/UL (ref 4.05–5.2)
WBC # BLD AUTO: 13.6 K/UL (ref 4.3–11.1)

## 2024-11-11 PROCEDURE — 99213 OFFICE O/P EST LOW 20 MIN: CPT | Performed by: OBSTETRICS & GYNECOLOGY

## 2024-11-11 NOTE — PROGRESS NOTES
Pau \"Julien\"  presents for MINAL. . 30w0d.    Taking Prenatal Vitamins: Yes  She is noticing:  had some \"tightening\" yday. None today. Was very busy yday. No lof, bldg, spotting, vag d/c.  Gfm  Declines flu/tdap  D/w PTL prec's  Cbc, cmp (per mfm), hep c today  Sees mfm 12-2  Fetus is rh neg by NIPS    As some impt info is always in the OB flowsheet, please also refer to that- including for billing/coding Qs.     No problem-specific Assessment & Plan notes found for this encounter.       Pau \"Julien\" was seen today for routine prenatal visit.    Diagnoses and all orders for this visit:    High-risk pregnancy in third trimester  -     Comprehensive Metabolic Panel; Future  -     Protein / creatinine ratio, urine; Future    Screen for STD (sexually transmitted disease)  -     Hepatitis C Antibody; Future    Rh negative status during pregnancy in third trimester  -     Antibody Screen; Future    Screening for deficiency anemia  -     CBC; Future    30 weeks gestation of pregnancy

## 2024-11-11 NOTE — PROGRESS NOTES
Pau \"Julien\"  presents for MINAL. . 30w0d.    PL and MFM notes reviewed as applicable.  Taking Prenatal Vitamins: Yes  She is noticing:  no unusual complaints    No problem-specific Assessment & Plan notes found for this encounter.

## 2024-11-12 LAB
ALBUMIN SERPL-MCNC: 3 G/DL (ref 3.5–5)
ALBUMIN/GLOB SERPL: 0.9 (ref 1–1.9)
ALP SERPL-CCNC: 92 U/L (ref 35–104)
ALT SERPL-CCNC: 13 U/L (ref 8–45)
ANION GAP SERPL CALC-SCNC: 12 MMOL/L (ref 7–16)
AST SERPL-CCNC: 19 U/L (ref 15–37)
BILIRUB SERPL-MCNC: 0.3 MG/DL (ref 0–1.2)
BUN SERPL-MCNC: 9 MG/DL (ref 6–23)
CALCIUM SERPL-MCNC: 9 MG/DL (ref 8.8–10.2)
CHLORIDE SERPL-SCNC: 104 MMOL/L (ref 98–107)
CO2 SERPL-SCNC: 22 MMOL/L (ref 20–29)
CREAT SERPL-MCNC: 0.75 MG/DL (ref 0.6–1.1)
GLOBULIN SER CALC-MCNC: 3.2 G/DL (ref 2.3–3.5)
GLUCOSE SERPL-MCNC: 99 MG/DL (ref 70–99)
HCV AB SER QL: NONREACTIVE
POTASSIUM SERPL-SCNC: 3.5 MMOL/L (ref 3.5–5.1)
PROT SERPL-MCNC: 6.3 G/DL (ref 6.3–8.2)
SODIUM SERPL-SCNC: 138 MMOL/L (ref 136–145)
T4 FREE SERPL-MCNC: 1.1 NG/DL (ref 0.9–1.7)
TSH, 3RD GENERATION: 2.21 UIU/ML (ref 0.27–4.2)

## 2024-11-26 NOTE — PROGRESS NOTES
vaginal bleeding, leaking of fluid, jaret regularly Q 5-7 minutes for over an hour or not feeling the baby move.     Kick counts and pre-term labor precautions reviewed

## 2024-11-27 ENCOUNTER — ROUTINE PRENATAL (OUTPATIENT)
Dept: OBGYN CLINIC | Age: 24
End: 2024-11-27
Payer: MEDICAID

## 2024-11-27 VITALS — BODY MASS INDEX: 29.01 KG/M2 | SYSTOLIC BLOOD PRESSURE: 118 MMHG | WEIGHT: 169 LBS | DIASTOLIC BLOOD PRESSURE: 64 MMHG

## 2024-11-27 DIAGNOSIS — Z14.1 CYSTIC FIBROSIS CARRIER: ICD-10-CM

## 2024-11-27 DIAGNOSIS — O99.343 MENTAL DISORDER AFFECTING PREGNANCY IN THIRD TRIMESTER: ICD-10-CM

## 2024-11-27 DIAGNOSIS — Z3A.32 32 WEEKS GESTATION OF PREGNANCY: ICD-10-CM

## 2024-11-27 DIAGNOSIS — R03.0 SINGLE EPISODE OF ELEVATED BLOOD PRESSURE: ICD-10-CM

## 2024-11-27 DIAGNOSIS — O21.0 HYPEREMESIS AFFECTING PREGNANCY, ANTEPARTUM: ICD-10-CM

## 2024-11-27 DIAGNOSIS — O09.93 HIGH-RISK PREGNANCY IN THIRD TRIMESTER: Primary | ICD-10-CM

## 2024-11-27 DIAGNOSIS — O09.293 HISTORY OF MISCARRIAGE, CURRENTLY PREGNANT, THIRD TRIMESTER: ICD-10-CM

## 2024-11-27 PROCEDURE — 99213 OFFICE O/P EST LOW 20 MIN: CPT | Performed by: NURSE PRACTITIONER

## 2024-11-27 NOTE — PATIENT INSTRUCTIONS
PTL/labor precautions, FMC, and pregnancy warning signs reviewed. Pt advised to call the office at [phone] or go straight to Labor and Delivery at [facility] with any of the following concerns vaginal bleeding, leaking of fluid, jaret regularly Q 5-7 minutes for over an hour or not feeling the baby move.     Kick counts and pre-term labor precautions reviewed

## 2024-11-30 ENCOUNTER — HOSPITAL ENCOUNTER (OUTPATIENT)
Age: 24
Discharge: HOME | End: 2024-11-30
Attending: OBSTETRICS & GYNECOLOGY | Admitting: OBSTETRICS & GYNECOLOGY
Payer: MEDICAID

## 2024-11-30 VITALS
SYSTOLIC BLOOD PRESSURE: 130 MMHG | OXYGEN SATURATION: 96 % | DIASTOLIC BLOOD PRESSURE: 78 MMHG | RESPIRATION RATE: 17 BRPM | HEART RATE: 100 BPM | TEMPERATURE: 98.2 F

## 2024-11-30 PROBLEM — O26.893 PELVIC PAIN AFFECTING PREGNANCY IN THIRD TRIMESTER, ANTEPARTUM: Status: RESOLVED | Noted: 2024-11-30 | Resolved: 2024-11-30

## 2024-11-30 PROBLEM — O26.893 PELVIC PAIN AFFECTING PREGNANCY IN THIRD TRIMESTER, ANTEPARTUM: Status: ACTIVE | Noted: 2024-11-30

## 2024-11-30 PROBLEM — R10.2 PELVIC PAIN AFFECTING PREGNANCY IN THIRD TRIMESTER, ANTEPARTUM: Status: ACTIVE | Noted: 2024-11-30

## 2024-11-30 PROBLEM — R10.2 PELVIC PAIN AFFECTING PREGNANCY IN THIRD TRIMESTER, ANTEPARTUM: Status: RESOLVED | Noted: 2024-11-30 | Resolved: 2024-11-30

## 2024-11-30 LAB
APPEARANCE UR: ABNORMAL
BACTERIA URNS QL MICRO: ABNORMAL /HPF
BILIRUB UR QL: NEGATIVE
COLOR UR: ABNORMAL
EPI CELLS #/AREA URNS HPF: ABNORMAL /HPF
FIBRONECTIN FETAL VAG QL: NEGATIVE
GLUCOSE UR STRIP.AUTO-MCNC: NEGATIVE MG/DL
HGB UR QL STRIP: NEGATIVE
HYALINE CASTS URNS QL MICRO: ABNORMAL /LPF
KETONES UR QL STRIP.AUTO: NEGATIVE MG/DL
LEUKOCYTE ESTERASE UR QL STRIP.AUTO: ABNORMAL
NITRITE UR QL STRIP.AUTO: NEGATIVE
PH UR STRIP: 6.5 (ref 5–9)
PROT UR STRIP-MCNC: NEGATIVE MG/DL
RBC #/AREA URNS HPF: ABNORMAL /HPF
SP GR UR REFRACTOMETRY: 1.01 (ref 1–1.02)
UROBILINOGEN UR QL STRIP.AUTO: 0.2 EU/DL (ref 0.2–1)
WBC URNS QL MICRO: ABNORMAL /HPF

## 2024-11-30 PROCEDURE — 99283 EMERGENCY DEPT VISIT LOW MDM: CPT

## 2024-11-30 PROCEDURE — 6370000000 HC RX 637 (ALT 250 FOR IP): Performed by: OBSTETRICS & GYNECOLOGY

## 2024-11-30 PROCEDURE — 81001 URINALYSIS AUTO W/SCOPE: CPT

## 2024-11-30 PROCEDURE — 82731 ASSAY OF FETAL FIBRONECTIN: CPT

## 2024-11-30 RX ORDER — NITROFURANTOIN 25; 75 MG/1; MG/1
100 CAPSULE ORAL EVERY 12 HOURS SCHEDULED
Status: DISCONTINUED | OUTPATIENT
Start: 2024-11-30 | End: 2024-11-30 | Stop reason: HOSPADM

## 2024-11-30 RX ORDER — NIFEDIPINE 10 MG/1
10 CAPSULE ORAL EVERY 8 HOURS SCHEDULED
Status: DISCONTINUED | OUTPATIENT
Start: 2024-11-30 | End: 2024-11-30

## 2024-11-30 RX ORDER — NITROFURANTOIN 25; 75 MG/1; MG/1
100 CAPSULE ORAL EVERY 12 HOURS SCHEDULED
Qty: 14 CAPSULE | Refills: 0 | Status: SHIPPED | OUTPATIENT
Start: 2024-11-30 | End: 2024-12-07

## 2024-11-30 RX ORDER — NIFEDIPINE 10 MG/1
10 CAPSULE ORAL ONCE
Status: COMPLETED | OUTPATIENT
Start: 2024-11-30 | End: 2024-11-30

## 2024-11-30 RX ADMIN — NITROFURANTOIN MONOHYDRATE/MACROCRYSTALS 100 MG: 75; 25 CAPSULE ORAL at 20:19

## 2024-11-30 RX ADMIN — NIFEDIPINE 10 MG: 10 CAPSULE ORAL at 19:39

## 2024-12-01 NOTE — DISCHARGE INSTRUCTIONS
PLEASE FOLLOW-UP WITH PRIMARY OB AT NEXT SCHEDULED VISIT. RETURN TO A [facility] IF EXPERIENCING CONTRACTIONS THAT BECOME STRONGER AND CLOSER TOGETHER, VAGINAL BLEEDING, LEAKING OF FLUIDS, OR DECREASED FETAL MOVEMENT.         Belly Pain in Pregnancy: Care Instructions  Overview     When you're pregnant, any belly pain can be a worry. You may not want to call your doctor or midwife about every pain you have. But you don't want to miss something that is dangerous for you or your baby.  Even if it feels familiar, belly pain can mean something new when you're pregnant.  It's important to know when to call your doctor or midwife. It will also help to know how to care for yourself at home when your pain is not caused by anything harmful.  When belly pain is more severe or constant, see a doctor or midwife right away.  If you're sure your belly pain is a sign of labor, call your doctor or midwife.  When belly pain is mild and brief and comes and goes, it's usually a normal part of pregnancy. It might be related to changes in the growing uterus. Or it could be the stretching of ligaments called round ligaments. These ligaments help support the uterus. Round ligament pain can be on either side of your belly. It can also be felt in your hips or groin.  Mild belly discomfort can also be related to digestion. Things like constipation, bloating, and heartburn are common during pregnancy.  Follow-up care is a key part of your treatment and safety. Be sure to make and go to all appointments, and call your doctor if you are having problems. It's also a good idea to know your test results and keep a list of the medicines you take.  How can you tell if belly pain is a sign of labor?  When belly pain is caused by labor, it can feel like mild or menstrual-like cramps in your lower belly. These cramps are probably contractions. They can happen in your second or third trimester.  You may also have:  A steady, dull ache in your lower back,

## 2024-12-01 NOTE — PROGRESS NOTES
Discharge orders received from MD. Discharge instructions given to pt and pt verbalized understanding. D/c home accompanied by .

## 2024-12-01 NOTE — PROGRESS NOTES
OB ED     Pt to [facility] with complaints of abdominal cramping  for a couple of days on and off. States she usually can lay down and rest and they go away but they didn't today. EFM and TOCO applied. Abd palpated soft. Positive fetal movement noted, denies LOF or VB. Pt denies any urinary symptoms.   Urine dip  GLU-neg  CESAR-neg  KET-neg  SG-1.020  BLO-neg  pH-6.5  PRO-neg  URO-0.2  NIT-neg  JENSEN-small

## 2024-12-01 NOTE — H&P
OB ED History & Physical    Name: Pau Hester MRN: [id number]  SSN: [id number]    YOB: 2000  Age: 24 y.o.  Sex: female      Subjective:     Reason for Triage visit:  32w5d and pelvic pain    History of Present Illness: Ms. Hester is a 24 y.o.  female with an estimated gestational age of 32w5d with Estimated Date of Delivery: 25. Patient states that active fetal movement, no vaginal bleeding, irregular crampy pains or irregular strength , no loss .  Pregnancy has been  complicated by hyperemesis. Patient denies chest pain, headache , nausea and vomiting, right upper quadrant pain  , shortness of breath, vaginal bleeding , and vaginal leaking of fluid .    OB History    Para Term  AB Living   2       1     SAB IAB Ectopic Molar Multiple Live Births   1                # Outcome Date GA Lbr Edmond/2nd Weight Sex Type Anes PTL Lv   2 Current            1 SAB 10/2021             Past Medical History:   Diagnosis Date    Anxiety and depression     not on medication, stable    Endometriosis     diagnosed by laparoscopy    GERD (gastroesophageal reflux disease)     Rh incompatibility     Vitamin D deficiency      Past Surgical History:   Procedure Laterality Date    CHOLECYSTECTOMY      LAPAROSCOPY  2023    x2     Social History     Occupational History    Not on file   Tobacco Use    Smoking status: Never    Smokeless tobacco: Never   Vaping Use    Vaping status: Never Used   Substance and Sexual Activity    Alcohol use: Never    Drug use: Never    Sexual activity: Yes     Partners: Male      Family History   Problem Relation Age of Onset    Ovarian Cancer Paternal Grandmother     Colon Cancer Paternal Grandmother     Heart Attack Maternal Grandmother     Heart Attack Maternal Grandfather     Hypertension Father     Heart Attack Father     Asthma Mother        Allergies   Allergen Reactions    Cephalexin Itching     Rash to hands     Prior to Admission medications

## 2024-12-02 ENCOUNTER — ROUTINE PRENATAL (OUTPATIENT)
Dept: OBGYN CLINIC | Age: 24
End: 2024-12-02
Payer: MEDICAID

## 2024-12-02 VITALS — HEART RATE: 118 BPM | DIASTOLIC BLOOD PRESSURE: 88 MMHG | SYSTOLIC BLOOD PRESSURE: 138 MMHG

## 2024-12-02 DIAGNOSIS — Z3A.33 33 WEEKS GESTATION OF PREGNANCY: ICD-10-CM

## 2024-12-02 DIAGNOSIS — R03.0 SINGLE EPISODE OF ELEVATED BLOOD PRESSURE: ICD-10-CM

## 2024-12-02 DIAGNOSIS — Z14.1 CYSTIC FIBROSIS CARRIER: ICD-10-CM

## 2024-12-02 DIAGNOSIS — O99.343 MENTAL DISORDER AFFECTING PREGNANCY IN THIRD TRIMESTER: ICD-10-CM

## 2024-12-02 DIAGNOSIS — O09.293 HISTORY OF MISCARRIAGE, CURRENTLY PREGNANT, THIRD TRIMESTER: ICD-10-CM

## 2024-12-02 DIAGNOSIS — O21.0 HYPEREMESIS AFFECTING PREGNANCY, ANTEPARTUM: Primary | ICD-10-CM

## 2024-12-02 DIAGNOSIS — O09.93 HIGH-RISK PREGNANCY IN THIRD TRIMESTER: ICD-10-CM

## 2024-12-02 PROCEDURE — 99214 OFFICE O/P EST MOD 30 MIN: CPT | Performed by: OBSTETRICS & GYNECOLOGY

## 2024-12-02 PROCEDURE — 76819 FETAL BIOPHYS PROFIL W/O NST: CPT | Performed by: OBSTETRICS & GYNECOLOGY

## 2024-12-02 PROCEDURE — 76816 OB US FOLLOW-UP PER FETUS: CPT | Performed by: OBSTETRICS & GYNECOLOGY

## 2024-12-02 ASSESSMENT — PATIENT HEALTH QUESTIONNAIRE - PHQ9
SUM OF ALL RESPONSES TO PHQ QUESTIONS 1-9: 0
SUM OF ALL RESPONSES TO PHQ9 QUESTIONS 1 & 2: 0
SUM OF ALL RESPONSES TO PHQ QUESTIONS 1-9: 0
1. LITTLE INTEREST OR PLEASURE IN DOING THINGS: NOT AT ALL
2. FEELING DOWN, DEPRESSED OR HOPELESS: NOT AT ALL
SUM OF ALL RESPONSES TO PHQ QUESTIONS 1-9: 0
SUM OF ALL RESPONSES TO PHQ QUESTIONS 1-9: 0

## 2024-12-02 NOTE — PROGRESS NOTES
[facility] MATERNAL FETAL MEDICINE    [address] [place] [address]  P- [phone]  [phone]   MFM Follow-up Visit    Pau Hester (2000) is a 24 y.o.  at 33w0d with 2025, by Ultrasound.   Presents for evaluation of the following chief complaint(s):   Chief Complaint   Patient presents with    Ultrasound     Growth and BPP    High Risk Pregnancy     Hyperemesis Gravidarum      Patient is not working outside of home   [facility]  Support person, mother, present today.     Interval history since prior appt reviewed and chart updated as indicated.    No HAs, edema.  Denies preeclamptic symptoms.  Reports good fetal movement.  No bleeding, LOF, cramping, ctxs, or vaginal pressure.      Has had HA and light-headedness over past few days. Not bad enough to take tylenol.   Does have nasal congestion but doesn't feel it is related.     Strict preE precautions.     Mood evaluated today based on discussion with pt and PHQ screen.       2024     8:49 AM   PHQ-9    Little interest or pleasure in doing things 0   Feeling down, depressed, or hopeless 0   PHQ-2 Score 0   PHQ-9 Total Score 0      Mood Reassuring today  Recommend lifestyle/behavioral modifications to enhance mood (exercise, sunshine, mood apps, nutritional modifications, etc).     Reviewed gestational age precautions and activity goals/limitations  Nutritional counseling as well as specific goals based on current maternal and fetal status    Addressed normal pregnancy complaints, reassured and offered suggestions for care  Options for GERD, constipation, other common complaints reviewed    Reviewed gestational age appropriate preventive care regarding communicable disease transmission and vaccines as appropriate (including flu, TDaP >28wk, RSV 32-36wk (-), as well as, COVID.)  All questions answered and concerns discussed. Additional recommendations in problem

## 2024-12-02 NOTE — PATIENT INSTRUCTIONS
Preeclampsia Precautions  Contact your OB office or go to E 4th floor [facility] if:    develop high blood pressure (>140/>90)  headache that does not improve with tylenol/rest/hydration  pain in area of your liver (under right breast) that does not resolve with position change  vision changes  Seizures (go straight to hospital emergently)  Baby is not moving well

## 2024-12-04 ENCOUNTER — HOSPITAL ENCOUNTER (OUTPATIENT)
Age: 24
Discharge: HOME | End: 2024-12-04
Attending: OBSTETRICS & GYNECOLOGY | Admitting: OBSTETRICS & GYNECOLOGY
Payer: MEDICAID

## 2024-12-04 VITALS
OXYGEN SATURATION: 97 % | SYSTOLIC BLOOD PRESSURE: 123 MMHG | DIASTOLIC BLOOD PRESSURE: 77 MMHG | TEMPERATURE: 98.1 F | HEART RATE: 101 BPM | WEIGHT: 165 LBS | HEIGHT: 64 IN | BODY MASS INDEX: 28.17 KG/M2

## 2024-12-04 PROBLEM — O47.9 FALSE LABOR: Status: RESOLVED | Noted: 2024-12-04 | Resolved: 2024-12-04

## 2024-12-04 PROBLEM — O47.9 FALSE LABOR: Status: ACTIVE | Noted: 2024-12-04

## 2024-12-04 PROCEDURE — 99283 EMERGENCY DEPT VISIT LOW MDM: CPT

## 2024-12-04 PROCEDURE — 59025 FETAL NON-STRESS TEST: CPT

## 2024-12-04 NOTE — PROGRESS NOTES
Pt to OBED1 for c/o contractions and stomach tightening. Denies any bleeding or leaking of fluid. States good fetal movement.

## 2024-12-04 NOTE — H&P
Obstetrics History & Physical/OB ED note    Name: Pau Hester MRN: [id number]     YOB: 2000  Age: 24 y.o.  Sex: female      Reason for Presentation:  contractions/possible labor    HPI: Pau Hester is a 24 y.o.  female with Estimated Date of Delivery: 25 at 33w2d gestation. Her obstetrical history is significant for previously uncomplicated pregnancy.  Prenatal records reviewed.     She was here in triage 4 days ago for abdominal cramping which was found to be contractions on monitoring. She had a negative FFN and closed cervix. She was also given Rx for macrobid for possible UTI on UA (no urine culture sent). Returns today with similar cramping intensity but more frequent. Says it isn't actually painful, but since it was found to be contractions on monitoring last time, she was concerned and came in.    She reports good fetal movement. She denies vaginal bleeding. She denies leakage of fluid.      Past History:  OB History    Para Term  AB Living   2       1     SAB IAB Ectopic Molar Multiple Live Births   1                # Outcome Date GA Lbr Edmond/2nd Weight Sex Type Anes PTL Lv   2 Current            1 SAB 10/2021             Past Medical History:   Diagnosis Date    Anxiety and depression     not on medication, stable    Endometriosis     diagnosed by laparoscopy    GERD (gastroesophageal reflux disease)     Rh incompatibility     Vitamin D deficiency      Past Surgical History:   Procedure Laterality Date    CHOLECYSTECTOMY      LAPAROSCOPY  2023    x2     Social History     Tobacco Use    Smoking status: Never    Smokeless tobacco: Never   Substance Use Topics    Alcohol use: Never     Prior to Admission medications    Medication Sig Start Date End Date Taking? Authorizing Provider   nitrofurantoin, macrocrystal-monohydrate, (MACROBID) 100 MG capsule Take 1 capsule by mouth every 12 hours for 14 doses 24 Yes Ag Downs MD   Ferrous

## 2024-12-04 NOTE — PROGRESS NOTES
Dr Salas at bedside. SVE closed/thick/high. Signs and symptoms of  labor discussed with patient, understanding verbalized. Pt discharged home ambulatory in stable condition.

## 2024-12-11 ENCOUNTER — ROUTINE PRENATAL (OUTPATIENT)
Dept: OBGYN CLINIC | Age: 24
End: 2024-12-11
Payer: MEDICAID

## 2024-12-11 VITALS — DIASTOLIC BLOOD PRESSURE: 80 MMHG | SYSTOLIC BLOOD PRESSURE: 132 MMHG | WEIGHT: 166 LBS | BODY MASS INDEX: 28.49 KG/M2

## 2024-12-11 DIAGNOSIS — R03.0 ELEVATED BLOOD PRESSURE READING: ICD-10-CM

## 2024-12-11 DIAGNOSIS — O09.93 HIGH-RISK PREGNANCY IN THIRD TRIMESTER: Primary | ICD-10-CM

## 2024-12-11 DIAGNOSIS — O21.0 HYPEREMESIS AFFECTING PREGNANCY, ANTEPARTUM: ICD-10-CM

## 2024-12-11 DIAGNOSIS — O09.293 HISTORY OF MISCARRIAGE, CURRENTLY PREGNANT, THIRD TRIMESTER: ICD-10-CM

## 2024-12-11 DIAGNOSIS — Z3A.34 34 WEEKS GESTATION OF PREGNANCY: ICD-10-CM

## 2024-12-11 DIAGNOSIS — O99.343 MENTAL DISORDER AFFECTING PREGNANCY IN THIRD TRIMESTER: ICD-10-CM

## 2024-12-11 DIAGNOSIS — Z14.1 CYSTIC FIBROSIS CARRIER: ICD-10-CM

## 2024-12-11 DIAGNOSIS — R03.0 SINGLE EPISODE OF ELEVATED BLOOD PRESSURE: ICD-10-CM

## 2024-12-11 LAB
ALBUMIN SERPL-MCNC: 3.1 G/DL (ref 3.5–5)
ALBUMIN/GLOB SERPL: 0.8 (ref 1–1.9)
ALP SERPL-CCNC: 121 U/L (ref 35–104)
ALT SERPL-CCNC: 11 U/L (ref 8–45)
ANION GAP SERPL CALC-SCNC: 11 MMOL/L (ref 7–16)
AST SERPL-CCNC: 18 U/L (ref 15–37)
BILIRUB SERPL-MCNC: 0.2 MG/DL (ref 0–1.2)
BUN SERPL-MCNC: 7 MG/DL (ref 6–23)
CALCIUM SERPL-MCNC: 9.3 MG/DL (ref 8.8–10.2)
CHLORIDE SERPL-SCNC: 103 MMOL/L (ref 98–107)
CO2 SERPL-SCNC: 23 MMOL/L (ref 20–29)
CREAT SERPL-MCNC: 0.75 MG/DL (ref 0.6–1.1)
ERYTHROCYTE [DISTWIDTH] IN BLOOD BY AUTOMATED COUNT: 13.3 % (ref 11.9–14.6)
GLOBULIN SER CALC-MCNC: 3.9 G/DL (ref 2.3–3.5)
GLUCOSE SERPL-MCNC: 84 MG/DL (ref 70–99)
HCT VFR BLD AUTO: 36.1 % (ref 35.8–46.3)
HGB BLD-MCNC: 12.3 G/DL (ref 11.7–15.4)
LDH SERPL L TO P-CCNC: 168 U/L (ref 127–281)
MCH RBC QN AUTO: 31.6 PG (ref 26.1–32.9)
MCHC RBC AUTO-ENTMCNC: 34.1 G/DL (ref 31.4–35)
MCV RBC AUTO: 92.8 FL (ref 82–102)
NRBC # BLD: 0 K/UL (ref 0–0.2)
PLATELET # BLD AUTO: 275 K/UL (ref 150–450)
PMV BLD AUTO: 10 FL (ref 9.4–12.3)
POTASSIUM SERPL-SCNC: 3.7 MMOL/L (ref 3.5–5.1)
PROT SERPL-MCNC: 6.9 G/DL (ref 6.3–8.2)
RBC # BLD AUTO: 3.89 M/UL (ref 4.05–5.2)
SODIUM SERPL-SCNC: 138 MMOL/L (ref 136–145)
URATE SERPL-MCNC: 4.5 MG/DL (ref 2.5–7.1)
WBC # BLD AUTO: 13.5 K/UL (ref 4.3–11.1)

## 2024-12-11 PROCEDURE — 99213 OFFICE O/P EST LOW 20 MIN: CPT | Performed by: NURSE PRACTITIONER

## 2024-12-11 NOTE — PATIENT INSTRUCTIONS
PTL/labor precautions, FMC, and pregnancy warning signs reviewed. Pt advised to call the office at [phone] or go straight to Labor and Delivery at [facility] with any of the following concerns vaginal bleeding, leaking of fluid, jaret regularly Q 5-7 minutes for over an hour or not feeling the baby move.     Kick counts and pre-term labor precautions reviewed     Pre-eclampsia Precautions discussed with the patient including but not limited to: elevated blood pressure, increased swelling in hands/feet/face, persistent headache, visual changes, nausea/vomiting & right upper quadrant pain. Pt advised to call the office at [phone] or go straight to Labor and Delivery at [facility] should any of the above occur.

## 2024-12-11 NOTE — RESULT ENCOUNTER NOTE
Hellp labs stable from Pre-eclampsia standpoint.     CBC: Hgb: 12.3, Plts: 275  CMP: AST: 18, ALT: 11  Uric Acid wnl-4.5  LD wnl-168

## 2024-12-11 NOTE — PROGRESS NOTES
OB return  Pau Hester is a 24 y.o. female   with 34w2d IUP with Estimated Date of Delivery: 25 presenting to the clinic as a routine OB.     The patient states she was seen in ER on  (7 days ago) due to abdominal cramping. In reviewing ER note, She was here in triage 4 days ago for abdominal cramping which was found to be contractions on monitoring. She had a negative FFN and closed cervix. She was also given Rx for macrobid for possible UTI on UA (no urine culture sent). Returns today with similar cramping intensity but more frequent. Says it isn't actually painful, but since it was found to be contractions on monitoring last time, she was concerned and came in.  She reports good fetal movement. She denies vaginal bleeding. She denies leakage of fluid. SVE was done and she was noted to be closed. After reassuring fetal status she was discharged home in stable condition.     She reports experiencing Dave Max contractions since ER visit and states \"there are days where I feel 6 or more an hour and other days I won't feel any contractions.\" She reports not feeling any contractions in a few days.   She denies leakage of fluid or vaginal bleeding and reports active fetal movement.     Reports experiencing HA's for past few weeks however, not feeling them consistently. She reports taking Bps at home and states \"I did take my Blood pressure once at home and got reading of 143/90-100s but then when I retook 30 minutes later, I did get a normal Blood pressure reading.\"     She denies HA, blurred vision or RUQ pain today.     FHTs: 130s    Problem list reviewed and updated    Pregnancy complicated by:  1. Cystic Fibrosis Carrier: FOB testing-Neg. 24 [facility]:  Reassuring fetal status. Growth today appropriate AC; For full details review formal ultrasound report.      2. Single episode of elevated BP: Initial /83 recheck-->130/84. Denies any PreE symptoms. Pc Ratio: 0.1     3.

## 2024-12-13 ENCOUNTER — ROUTINE PRENATAL (OUTPATIENT)
Dept: OBGYN CLINIC | Age: 24
End: 2024-12-13
Payer: MEDICAID

## 2024-12-13 VITALS — WEIGHT: 167 LBS | SYSTOLIC BLOOD PRESSURE: 138 MMHG | BODY MASS INDEX: 28.67 KG/M2 | DIASTOLIC BLOOD PRESSURE: 84 MMHG

## 2024-12-13 DIAGNOSIS — O36.8130 DECREASED FETAL MOVEMENTS IN THIRD TRIMESTER, SINGLE OR UNSPECIFIED FETUS: ICD-10-CM

## 2024-12-13 DIAGNOSIS — Z3A.34 34 WEEKS GESTATION OF PREGNANCY: ICD-10-CM

## 2024-12-13 DIAGNOSIS — O09.93 HIGH-RISK PREGNANCY IN THIRD TRIMESTER: Primary | ICD-10-CM

## 2024-12-13 DIAGNOSIS — R03.0 SINGLE EPISODE OF ELEVATED BLOOD PRESSURE: ICD-10-CM

## 2024-12-13 PROCEDURE — 59025 FETAL NON-STRESS TEST: CPT | Performed by: NURSE PRACTITIONER

## 2024-12-13 PROCEDURE — 99213 OFFICE O/P EST LOW 20 MIN: CPT | Performed by: NURSE PRACTITIONER

## 2024-12-13 NOTE — PROGRESS NOTES
OB return  Pau Hester is a 24 y.o. female   with 34w4d IUP with Estimated Date of Delivery: 25 presenting to the clinic today for repeat blood pressure.     The patient states she has not felt much fetal movement and she was placed on NST.     NST: today    Indications: Decreased fetal movement  Time: 25 minutes   Results: Reactive   FHR Baseline Rate: 145bpm  Periodic Changes: Mod variability, 15x15 accels, no decels noted  Comments: Overall fetal reassurance.   Repeat:  As clinically indicated     She denies feeling regular contractions, leakage of fluid or vaginal bleeding.     Problem list reviewed and updated    Pregnancy complicated by:  1. Cystic Fibrosis Carrier: FOB testing-Neg. 24 [facility]:  Reassuring fetal status. Growth today appropriate AC; For full details review formal ultrasound report.      2. Single episode of elevated BP: Initial /83 recheck-->130/84. Denies any PreE symptoms. Pc Ratio: 0.1     3. Hyperemesis: 10/31/24 [facility]:  Taking Pepcid 20 mg q hs, occasional nausea mostly when first waking up in the morning.  Can increase Pepcid to BID prn. 24 [facility]:  Patient states nausea managed with pepcid.      4. Single episode of elevated BP: 24 [facility]:  /88. Reviewed Pre-eclampsia precautions. Monitor closely. High risk for worsening and development of HTN of pregnancy.     Physical Examination:  /84   Wt 75.8 kg (167 lb)   LMP 2024 (Exact Date)   BMI 28.67 kg/m²    Gen: AAOx3  Ab: soft, NTTP, gravid uterus  Skin: no edema    Plan:  --Reactive NST discussed with patient, overall fetal reassurance. Strict kick counts discussed with patient today.   --Hellp labs collected at previous visit stable from PreE standpoint. Discussed with patient today.   --BP: 138/84. She denies PreE symptoms today. Encouraged her to start taking Bps and should she receive BP readings >140s/90s to inform us. She is understanding of this.   --RTC as scheduled for rhea on

## 2024-12-24 ENCOUNTER — HOSPITAL ENCOUNTER (OUTPATIENT)
Age: 24
Discharge: HOME | End: 2024-12-24
Attending: OBSTETRICS & GYNECOLOGY | Admitting: OBSTETRICS & GYNECOLOGY
Payer: MEDICAID

## 2024-12-24 VITALS
RESPIRATION RATE: 16 BRPM | TEMPERATURE: 98.7 F | BODY MASS INDEX: 28.23 KG/M2 | SYSTOLIC BLOOD PRESSURE: 116 MMHG | HEIGHT: 64 IN | HEART RATE: 107 BPM | DIASTOLIC BLOOD PRESSURE: 64 MMHG | WEIGHT: 165.34 LBS

## 2024-12-24 PROBLEM — O21.9 NAUSEA AND VOMITING IN PREGNANCY: Status: ACTIVE | Noted: 2024-12-24

## 2024-12-24 LAB
ALBUMIN SERPL-MCNC: 3.1 G/DL (ref 3.5–5)
ALBUMIN/GLOB SERPL: 0.9 (ref 1–1.9)
ALP SERPL-CCNC: 137 U/L (ref 35–104)
ALT SERPL-CCNC: 13 U/L (ref 8–45)
ANION GAP SERPL CALC-SCNC: 18 MMOL/L (ref 7–16)
AST SERPL-CCNC: 23 U/L (ref 15–37)
BASOPHILS # BLD: 0.1 K/UL (ref 0–0.2)
BASOPHILS NFR BLD: 0 % (ref 0–2)
BILIRUB SERPL-MCNC: 0.6 MG/DL (ref 0–1.2)
BUN SERPL-MCNC: 8 MG/DL (ref 6–23)
CALCIUM SERPL-MCNC: 9.4 MG/DL (ref 8.8–10.2)
CHLORIDE SERPL-SCNC: 103 MMOL/L (ref 98–107)
CO2 SERPL-SCNC: 21 MMOL/L (ref 20–29)
CREAT SERPL-MCNC: 0.84 MG/DL (ref 0.6–1.1)
DIFFERENTIAL METHOD BLD: ABNORMAL
EOSINOPHIL # BLD: 0 K/UL (ref 0–0.8)
EOSINOPHIL NFR BLD: 0 % (ref 0.5–7.8)
ERYTHROCYTE [DISTWIDTH] IN BLOOD BY AUTOMATED COUNT: 13.2 % (ref 11.9–14.6)
GLOBULIN SER CALC-MCNC: 3.5 G/DL (ref 2.3–3.5)
GLUCOSE SERPL-MCNC: 95 MG/DL (ref 70–99)
HCT VFR BLD AUTO: 36.8 % (ref 35.8–46.3)
HGB BLD-MCNC: 12.8 G/DL (ref 11.7–15.4)
IMM GRANULOCYTES # BLD AUTO: 0.3 K/UL (ref 0–0.5)
IMM GRANULOCYTES NFR BLD AUTO: 1 % (ref 0–5)
LYMPHOCYTES # BLD: 0.4 K/UL (ref 0.5–4.6)
LYMPHOCYTES NFR BLD: 2 % (ref 13–44)
MCH RBC QN AUTO: 31.1 PG (ref 26.1–32.9)
MCHC RBC AUTO-ENTMCNC: 34.8 G/DL (ref 31.4–35)
MCV RBC AUTO: 89.5 FL (ref 82–102)
MONOCYTES # BLD: 0.7 K/UL (ref 0.1–1.3)
MONOCYTES NFR BLD: 3 % (ref 4–12)
NEUTS SEG # BLD: 20.4 K/UL (ref 1.7–8.2)
NEUTS SEG NFR BLD: 94 % (ref 43–78)
NRBC # BLD: 0 K/UL (ref 0–0.2)
PLATELET # BLD AUTO: 290 K/UL (ref 150–450)
PMV BLD AUTO: 9.7 FL (ref 9.4–12.3)
POTASSIUM SERPL-SCNC: 3.9 MMOL/L (ref 3.5–5.1)
PROT SERPL-MCNC: 6.6 G/DL (ref 6.3–8.2)
RBC # BLD AUTO: 4.11 M/UL (ref 4.05–5.2)
SODIUM SERPL-SCNC: 142 MMOL/L (ref 136–145)
WBC # BLD AUTO: 21.8 K/UL (ref 4.3–11.1)

## 2024-12-24 PROCEDURE — 96360 HYDRATION IV INFUSION INIT: CPT

## 2024-12-24 PROCEDURE — 6360000002 HC RX W HCPCS: Performed by: OBSTETRICS & GYNECOLOGY

## 2024-12-24 PROCEDURE — 99284 EMERGENCY DEPT VISIT MOD MDM: CPT

## 2024-12-24 PROCEDURE — 96375 TX/PRO/DX INJ NEW DRUG ADDON: CPT

## 2024-12-24 PROCEDURE — 96374 THER/PROPH/DIAG INJ IV PUSH: CPT

## 2024-12-24 PROCEDURE — 80053 COMPREHEN METABOLIC PANEL: CPT

## 2024-12-24 PROCEDURE — 2580000003 HC RX 258: Performed by: OBSTETRICS & GYNECOLOGY

## 2024-12-24 PROCEDURE — 2500000003 HC RX 250 WO HCPCS: Performed by: OBSTETRICS & GYNECOLOGY

## 2024-12-24 PROCEDURE — 85025 COMPLETE CBC W/AUTO DIFF WBC: CPT

## 2024-12-24 RX ORDER — CALCIUM CARBONATE 500 MG/1
1 TABLET, CHEWABLE ORAL DAILY
COMMUNITY

## 2024-12-24 RX ORDER — ONDANSETRON 2 MG/ML
4 INJECTION INTRAMUSCULAR; INTRAVENOUS ONCE
Status: COMPLETED | OUTPATIENT
Start: 2024-12-24 | End: 2024-12-24

## 2024-12-24 RX ORDER — ONDANSETRON 4 MG/1
4 TABLET, ORALLY DISINTEGRATING ORAL 3 TIMES DAILY PRN
Qty: 15 TABLET | Refills: 0 | Status: SHIPPED | OUTPATIENT
Start: 2024-12-24

## 2024-12-24 RX ORDER — SODIUM CHLORIDE, SODIUM LACTATE, POTASSIUM CHLORIDE, AND CALCIUM CHLORIDE .6; .31; .03; .02 G/100ML; G/100ML; G/100ML; G/100ML
1000 INJECTION, SOLUTION INTRAVENOUS ONCE
Status: COMPLETED | OUTPATIENT
Start: 2024-12-24 | End: 2024-12-24

## 2024-12-24 RX ADMIN — ONDANSETRON 4 MG: 2 INJECTION, SOLUTION INTRAMUSCULAR; INTRAVENOUS at 17:07

## 2024-12-24 RX ADMIN — SODIUM CHLORIDE, POTASSIUM CHLORIDE, SODIUM LACTATE AND CALCIUM CHLORIDE 1000 ML: 600; 310; 30; 20 INJECTION, SOLUTION INTRAVENOUS at 17:04

## 2024-12-24 RX ADMIN — FAMOTIDINE 20 MG: 10 INJECTION, SOLUTION INTRAVENOUS at 18:29

## 2024-12-24 NOTE — PROGRESS NOTES
Sve: closed / soft / high per Dr Haque  Pt able to sip on a sprite and has been able to keep things down.

## 2024-12-24 NOTE — H&P
Obstetrics History & Physical/OB ED note    Name: Pau Hester MRN: [id number]     YOB: 2000  Age: 24 y.o.  Sex: female      Reason for Presentation:   nausea/vomiting and contractions    HPI: Pau Hester is a 24 y.o.  female with Estimated Date of Delivery: 25 at 36w1d gestation. Pt c/o nausea/vomiting x 1 day. Unable to keep anything down today.  No fever. Also c/o contractions - started today.  No bleeding no leaking. Active baby.  No other sick contacts. Slight diarrhea today x1.    Her obstetrical history is significant for  single episode of elevated BP 10/31/24, h/o anxiety/depression, CF carrier, fob neg, hyperemesis this pregnancy .  Prenatal records reviewed, [facility].      Past History:  OB History    Para Term  AB Living   2       1     SAB IAB Ectopic Molar Multiple Live Births   1                # Outcome Date GA Lbr Edmond/2nd Weight Sex Type Anes PTL Lv   2 Current            1 SAB 10/2021             Past Medical History:   Diagnosis Date    Anxiety and depression     not on medication, stable    Endometriosis     diagnosed by laparoscopy    GERD (gastroesophageal reflux disease)     Rh incompatibility     Vitamin D deficiency      Past Surgical History:   Procedure Laterality Date    CHOLECYSTECTOMY      LAPAROSCOPY  2023    x2     Social History     Tobacco Use    Smoking status: Never    Smokeless tobacco: Never   Substance Use Topics    Alcohol use: Never     Prior to Admission medications    Medication Sig Start Date End Date Taking? Authorizing Provider   Ferrous Sulfate (IRON PO) Take by mouth    Espinoza Snell MD   Fish Oil-Cholecalciferol (OMEGA-3 + D PO) Take by mouth    Espinoza Snell MD   famotidine (PEPCID) 20 MG tablet Take 1 tablet by mouth 2 times daily    Espinoza Snell MD   Prenatal Vit-Fe Fum-FA-Omega (ONE-A-DAY WOMENS PRENATAL) 28-0.8 & 440 MG MISC Take 1 tablet by mouth daily 24

## 2024-12-25 NOTE — DISCHARGE INSTRUCTIONS
Counting Your Baby's Kicks: Care Instructions  Overview     Counting your baby's kicks is one way your doctor can tell that your baby is healthy. You will probably feel your baby move for the first time between 16 and 22 weeks. The movement may feel like flutters rather than kicks. Your baby may move more at certain times of the day. When you are active, you may notice less kicking than when you are resting. At your prenatal visits, your doctor will ask whether the baby is active.  In your last trimester, your doctor may ask you to count the number of times you feel your baby move.  Follow-up care is a key part of your treatment and safety. Be sure to make and go to all appointments, and call your doctor if you are having problems. It's also a good idea to know your test results and keep a list of the medicines you take.  How do you count fetal kicks?  A common method of checking your baby's movement is to note the length of time it takes to count 10 movements (such as kicks, flutters, or rolls).  Pick your baby's most active time of day to count. This may be any time from morning to evening.  If you don't feel 10 movements in an hour, have something to eat or drink and count for another hour. If you don't feel at least 10 movements in the 2-hour period, call your doctor.  Do not use an at-home Doppler heart monitor in place of counting fetal movements.  When should you call for help?   Call your doctor now or seek immediate medical care if:    You feel fewer than 10 movements in a 2-hour period.     You noticed that your baby has stopped moving or is moving less than normal.   Watch closely for changes in your health, and be sure to contact your doctor if you have any problems.  Where can you learn more?  Go to https://www.[email or web address].net/patientEd and enter U048 to learn more about \"Counting Your Baby's Kicks: Care Instructions.\"  Current as of: July 10, 2023  Content Version: 14.2  © 2024 [facility],

## 2024-12-26 ENCOUNTER — ROUTINE PRENATAL (OUTPATIENT)
Dept: OBGYN CLINIC | Age: 24
End: 2024-12-26
Payer: MEDICAID

## 2024-12-26 VITALS — BODY MASS INDEX: 29.04 KG/M2 | WEIGHT: 168 LBS | SYSTOLIC BLOOD PRESSURE: 126 MMHG | DIASTOLIC BLOOD PRESSURE: 78 MMHG

## 2024-12-26 DIAGNOSIS — Z36.85 ENCOUNTER FOR ANTENATAL SCREENING FOR STREPTOCOCCUS B: Primary | ICD-10-CM

## 2024-12-26 DIAGNOSIS — R03.0 SINGLE EPISODE OF ELEVATED BLOOD PRESSURE: ICD-10-CM

## 2024-12-26 DIAGNOSIS — O09.293 HISTORY OF MISCARRIAGE, CURRENTLY PREGNANT, THIRD TRIMESTER: ICD-10-CM

## 2024-12-26 DIAGNOSIS — Z14.1 CYSTIC FIBROSIS CARRIER: ICD-10-CM

## 2024-12-26 DIAGNOSIS — O09.93 HIGH-RISK PREGNANCY IN THIRD TRIMESTER: ICD-10-CM

## 2024-12-26 DIAGNOSIS — O21.0 HYPEREMESIS AFFECTING PREGNANCY, ANTEPARTUM: ICD-10-CM

## 2024-12-26 DIAGNOSIS — O99.343 MENTAL DISORDER AFFECTING PREGNANCY IN THIRD TRIMESTER: ICD-10-CM

## 2024-12-26 PROCEDURE — 99213 OFFICE O/P EST LOW 20 MIN: CPT | Performed by: OBSTETRICS & GYNECOLOGY

## 2024-12-26 NOTE — PROGRESS NOTES
Pau \"Julien\"  presents for MINAL. . 36w3d.    PL and any MFM notes reviewed.. Med list reviewed.  Taking Prenatal Vitamins: Yes  She is noticing:  nausea, vomiting and contractions, went to triage on Dec 24th. Better now.  GBS collected.    See OB VS for today's vitals, FHR, fundal height and presentation.    The following were addressed today:  Problem List             Diagnosed       Unprioritized    Hyperemesis affecting pregnancy, antepartum     High-risk pregnancy in third trimester     Relevant Orders    Culture, Strep B Screen, Vaginal/Rectal    History of miscarriage, currently pregnant, third trimester     Anxiety and Depression affecting pregnancy     Cystic fibrosis carrier     Single episode of elevated blood pressure

## 2024-12-29 LAB
BACTERIA SPEC CULT: NORMAL
SERVICE CMNT-IMP: NORMAL

## 2025-01-01 ENCOUNTER — APPOINTMENT (OUTPATIENT)
Dept: GENERAL RADIOLOGY | Age: 25
End: 2025-01-01
Payer: MEDICAID

## 2025-01-01 ENCOUNTER — HOSPITAL ENCOUNTER (EMERGENCY)
Age: 25
Discharge: HOME | End: 2025-01-01
Attending: OBSTETRICS & GYNECOLOGY | Admitting: OBSTETRICS & GYNECOLOGY
Payer: MEDICAID

## 2025-01-01 VITALS
DIASTOLIC BLOOD PRESSURE: 94 MMHG | HEART RATE: 110 BPM | TEMPERATURE: 97.5 F | SYSTOLIC BLOOD PRESSURE: 143 MMHG | OXYGEN SATURATION: 92 % | RESPIRATION RATE: 16 BRPM

## 2025-01-01 DIAGNOSIS — S53.104A CLOSED DISLOCATION OF RIGHT ELBOW, INITIAL ENCOUNTER: Primary | ICD-10-CM

## 2025-01-01 LAB — BLOOD GROUP ANTIBODIES SERPL: NORMAL

## 2025-01-01 PROCEDURE — 99284 EMERGENCY DEPT VISIT MOD MDM: CPT

## 2025-01-01 PROCEDURE — 96374 THER/PROPH/DIAG INJ IV PUSH: CPT

## 2025-01-01 PROCEDURE — 73070 X-RAY EXAM OF ELBOW: CPT

## 2025-01-01 PROCEDURE — 6370000000 HC RX 637 (ALT 250 FOR IP): Performed by: PHYSICIAN ASSISTANT

## 2025-01-01 PROCEDURE — 99282 EMERGENCY DEPT VISIT SF MDM: CPT

## 2025-01-01 PROCEDURE — 86850 RBC ANTIBODY SCREEN: CPT

## 2025-01-01 PROCEDURE — 73110 X-RAY EXAM OF WRIST: CPT

## 2025-01-01 PROCEDURE — 96372 THER/PROPH/DIAG INJ SC/IM: CPT

## 2025-01-01 PROCEDURE — 6360000002 HC RX W HCPCS: Performed by: OBSTETRICS & GYNECOLOGY

## 2025-01-01 PROCEDURE — 24600 TX CLSD ELBOW DISLC W/O ANES: CPT

## 2025-01-01 PROCEDURE — 6360000002 HC RX W HCPCS: Performed by: EMERGENCY MEDICINE

## 2025-01-01 RX ORDER — HYDROMORPHONE HYDROCHLORIDE 1 MG/ML
1 INJECTION, SOLUTION INTRAMUSCULAR; INTRAVENOUS; SUBCUTANEOUS
Status: COMPLETED | OUTPATIENT
Start: 2025-01-01 | End: 2025-01-01

## 2025-01-01 RX ORDER — HYDROCODONE BITARTRATE AND ACETAMINOPHEN 7.5; 325 MG/1; MG/1
1 TABLET ORAL
Status: COMPLETED | OUTPATIENT
Start: 2025-01-01 | End: 2025-01-01

## 2025-01-01 RX ADMIN — RHO(D) IMMUNE GLOBULIN (HUMAN) 300 MCG: 1500 SOLUTION INTRAMUSCULAR at 15:42

## 2025-01-01 RX ADMIN — HYDROMORPHONE HYDROCHLORIDE 1 MG: 1 INJECTION, SOLUTION INTRAMUSCULAR; INTRAVENOUS; SUBCUTANEOUS at 14:31

## 2025-01-01 RX ADMIN — HYDROCODONE BITARTRATE AND ACETAMINOPHEN 1 TABLET: 7.5; 325 TABLET ORAL at 13:35

## 2025-01-01 ASSESSMENT — LIFESTYLE VARIABLES
HOW MANY STANDARD DRINKS CONTAINING ALCOHOL DO YOU HAVE ON A TYPICAL DAY: PATIENT DOES NOT DRINK
HOW OFTEN DO YOU HAVE A DRINK CONTAINING ALCOHOL: NEVER

## 2025-01-01 ASSESSMENT — PAIN SCALES - GENERAL: PAINLEVEL_OUTOF10: 10

## 2025-01-01 NOTE — ED PROVIDER NOTES
Emergency Department Provider Note       PCP: Raman Mosley MD   Age: 24 y.o.   Sex: female     DISPOSITION Decision To Discharge 01/01/2025 03:34:22 PM   DISPOSITION CONDITION Stable            ICD-10-CM    1. Closed dislocation of right elbow, initial encounter  S53.104A           Medical Decision Making     24-year-old female presented with right elbow pain.  Had a fall just prior to arrival.  She was cleared by OB/GYN prior to coming to this department.  X-ray obtained did reveal a posterior dislocation of the right elbow.  We discussed how to move forward with the OB hospitalist given that she is 37 weeks pregnant.  They did say that at this point, propofol could be used safely given she is near the end of her pregnancy.  We also discussed just parenteral analgesic medications.  We elected to attempt reduction with 1 mg of Dilaudid.  She received the Dilaudid and we attempted reduction 15 minutes later.  We did achieve successful reduction of her right elbow which was confirmed with postreduction films.  She will be discharged home with instructions to use over-the-counter analgesic medication such as Tylenol.  Counseled against using ibuprofen.  Ice the affected joint.  She is to follow-up with OB/GYN.  She understands return precaution.    ED Course as of 01/01/25 1538   Wed Jan 01, 2025   1424 Wet read reveals posterior dislocation of right elbow. No visible fracture. Attending physician discussing with OB hospitalist how to proceed in regards to analgesia vs sedation for reduction. [MO]   1454 Successful reduction with just parenteral analgesic medications.  Will obtain postreduction films for confirmation. [MO]   1502 Patient's pain now improved status post reduction.  X-ray obtaining post reduction films now. [MO]      ED Course User Index  [MO] Ag Ferrari PA     1 acute complicated illness or injury.  Over the counter drug management performed.  Parental controlled substances given in the

## 2025-01-01 NOTE — DISCHARGE INSTRUCTIONS
You were seen today for a dislocated elbow. Fortunately, we were able to put this back into proper place without having to sedate you (put you to sleep).    You may use Tylenol as needed for any analgesic effect. Icing the joint will also give you some benefit.Please follow up with you primary care physician and OB/GYN.

## 2025-01-01 NOTE — ED TRIAGE NOTES
History & Physical    Name: Pau Hester MRN: [id number]  SSN: [id number]    YOB: 2000  Age: 24 y.o.  Sex: female      Subjective:     Reason for Triage visit:  37w2d and c/o large dog pulled her down where she landed on Right arm to avoid any abdominal trauma. Of note she avoided any trauma to abdomen.    History of Present Illness: Ms. Hester is a 24 y.o.  female with an estimated gestational age of 37w2d with Estimated Date of Delivery: 25. Patient states that arm injury occurred approx 1 hour ago.  Pregnancy has been  complicated by RH negative. Patient denies abdominal pain  , chest pain, contractions, fever, headache , nausea and vomiting, pelvic pressure, right upper quadrant pain  , shortness of breath, vaginal bleeding , vaginal leaking of fluid , and visual disturbances.    OB History    Para Term  AB Living   2       1     SAB IAB Ectopic Molar Multiple Live Births   1                # Outcome Date GA Lbr Edmond/2nd Weight Sex Type Anes PTL Lv   2 Current            1 SAB 10/2021             Past Medical History:   Diagnosis Date    Anxiety and depression     not on medication, stable    Endometriosis     diagnosed by laparoscopy    GERD (gastroesophageal reflux disease)     Rh incompatibility     Vitamin D deficiency      Past Surgical History:   Procedure Laterality Date    CHOLECYSTECTOMY      LAPAROSCOPY  2023    x2     Social History     Occupational History    Not on file   Tobacco Use    Smoking status: Never    Smokeless tobacco: Never   Vaping Use    Vaping status: Never Used   Substance and Sexual Activity    Alcohol use: Never    Drug use: Never    Sexual activity: Yes     Partners: Male      Family History   Problem Relation Age of Onset    Ovarian Cancer Paternal Grandmother     Colon Cancer Paternal Grandmother     Heart Attack Maternal Grandmother     Heart Attack Maternal Grandfather     Hypertension Father     Heart Attack Father

## 2025-01-01 NOTE — PROGRESS NOTES
Pt to [facility] for a fall. Pt stated she fell over the dog and caught herself with her arm so she would not fall on her belly. Pt placed onEFM for a reactive NST.

## 2025-01-02 ENCOUNTER — OFFICE VISIT (OUTPATIENT)
Age: 25
End: 2025-01-02
Payer: MEDICAID

## 2025-01-02 DIAGNOSIS — S53.104A CLOSED DISLOCATION OF RIGHT ELBOW, INITIAL ENCOUNTER: ICD-10-CM

## 2025-01-02 DIAGNOSIS — S52.124A CLOSED NONDISPLACED FRACTURE OF HEAD OF RIGHT RADIUS, INITIAL ENCOUNTER: ICD-10-CM

## 2025-01-02 DIAGNOSIS — M25.521 RIGHT ELBOW PAIN: Primary | ICD-10-CM

## 2025-01-02 PROCEDURE — 99204 OFFICE O/P NEW MOD 45 MIN: CPT | Performed by: ORTHOPAEDIC SURGERY

## 2025-01-02 RX ORDER — ACETAMINOPHEN 325 MG/1
325 TABLET ORAL EVERY 6 HOURS PRN
COMMUNITY

## 2025-01-02 NOTE — PROGRESS NOTES
[facility] Note    Name: Pau Hester  YOB: 2000  Gender: female  MRN: [id number]      CC: Patient referred for evaluation of upper extremity pain    HPI: Pau Hester is a 24 y.o. female with a chief complaint of injury to her right elbow which occurred on 1/1/2025 when her dog pulled her down.  She was seen in the emergency department.  She is found to have a dislocation of her elbow.  She was reduced but she was not placed in a splint.  She was only given a sling.  She denies any numbness or tingling.      ROS/Meds/PSH/PMH/FH/SH: I personally reviewed the patients standard intake form.  Pertinents are discussed in the HPI    Physical Examination:    Musculoskeletal Exam:  Examination on the right upper extremity demonstrates cap refill < 5 seconds in all fingers, skin is intact, there is swelling tenderness to palpation and ecchymosis about the elbow.  There is no tenderness to palpation at the wrist or hand.  Elbow range of motion was not assessed at this time.  Light touch sensation is intact to median ulnar and radial distributions..    Imaging / Electrodiagnostic Tests:     I independently reviewed and interpreted right elbow radiographs.  They demonstrate posterior elbow dislocation with subsequent concentric reduction.    Elbow  XR: AP, Lateral, Oblique views     Clinical Indication:    ICD-10-CM    1. Right elbow pain  M25.521 XR ELBOW RIGHT (2 VIEWS)      2. Closed dislocation of right elbow, initial encounter  S53.104A       3. Closed nondisplaced fracture of head of right radius, initial encounter  S52.124A              Report: AP, lateral, oblique x-ray of the right elbow demonstrates maintained concentric reduction of the elbow.  There may be a small nondisplaced radial head fracture    Impression: as above     Mackenzie Saab MD           Assessment:     ICD-10-CM    1. Right elbow pain  M25.521 XR ELBOW RIGHT (2 VIEWS)      2. Closed dislocation of right elbow,

## 2025-01-06 ENCOUNTER — ROUTINE PRENATAL (OUTPATIENT)
Dept: OBGYN CLINIC | Age: 25
End: 2025-01-06
Payer: MEDICAID

## 2025-01-06 VITALS — BODY MASS INDEX: 30.07 KG/M2 | WEIGHT: 174 LBS | DIASTOLIC BLOOD PRESSURE: 72 MMHG | SYSTOLIC BLOOD PRESSURE: 136 MMHG

## 2025-01-06 DIAGNOSIS — R03.0 SINGLE EPISODE OF ELEVATED BLOOD PRESSURE: ICD-10-CM

## 2025-01-06 DIAGNOSIS — O09.93 HIGH-RISK PREGNANCY IN THIRD TRIMESTER: Primary | ICD-10-CM

## 2025-01-06 DIAGNOSIS — O21.0 HYPEREMESIS AFFECTING PREGNANCY, ANTEPARTUM: ICD-10-CM

## 2025-01-06 DIAGNOSIS — O09.293 HISTORY OF MISCARRIAGE, CURRENTLY PREGNANT, THIRD TRIMESTER: ICD-10-CM

## 2025-01-06 DIAGNOSIS — O99.343 MENTAL DISORDER AFFECTING PREGNANCY IN THIRD TRIMESTER: ICD-10-CM

## 2025-01-06 DIAGNOSIS — Z3A.38 38 WEEKS GESTATION OF PREGNANCY: ICD-10-CM

## 2025-01-06 DIAGNOSIS — Z14.1 CYSTIC FIBROSIS CARRIER: ICD-10-CM

## 2025-01-06 LAB
ALBUMIN SERPL-MCNC: 2.8 G/DL (ref 3.5–5)
ALBUMIN/GLOB SERPL: 0.8 (ref 1–1.9)
ALP SERPL-CCNC: 133 U/L (ref 35–104)
ALT SERPL-CCNC: 18 U/L (ref 8–45)
ANION GAP SERPL CALC-SCNC: 11 MMOL/L (ref 7–16)
AST SERPL-CCNC: 20 U/L (ref 15–37)
BILIRUB SERPL-MCNC: 0.3 MG/DL (ref 0–1.2)
BUN SERPL-MCNC: 8 MG/DL (ref 6–23)
CALCIUM SERPL-MCNC: 9.2 MG/DL (ref 8.8–10.2)
CHLORIDE SERPL-SCNC: 106 MMOL/L (ref 98–107)
CO2 SERPL-SCNC: 24 MMOL/L (ref 20–29)
CREAT SERPL-MCNC: 0.86 MG/DL (ref 0.6–1.1)
CREAT UR-MCNC: 40.4 MG/DL (ref 28–217)
ERYTHROCYTE [DISTWIDTH] IN BLOOD BY AUTOMATED COUNT: 13.5 % (ref 11.9–14.6)
GLOBULIN SER CALC-MCNC: 3.4 G/DL (ref 2.3–3.5)
GLUCOSE SERPL-MCNC: 94 MG/DL (ref 70–99)
HCT VFR BLD AUTO: 34.1 % (ref 35.8–46.3)
HGB BLD-MCNC: 11.6 G/DL (ref 11.7–15.4)
LDH SERPL L TO P-CCNC: 173 U/L (ref 127–281)
MCH RBC QN AUTO: 31 PG (ref 26.1–32.9)
MCHC RBC AUTO-ENTMCNC: 34 G/DL (ref 31.4–35)
MCV RBC AUTO: 91.2 FL (ref 82–102)
NRBC # BLD: 0 K/UL (ref 0–0.2)
PLATELET # BLD AUTO: 306 K/UL (ref 150–450)
PMV BLD AUTO: 10 FL (ref 9.4–12.3)
POTASSIUM SERPL-SCNC: 3.4 MMOL/L (ref 3.5–5.1)
PROT SERPL-MCNC: 6.2 G/DL (ref 6.3–8.2)
PROT UR-MCNC: 8 MG/DL
PROT/CREAT UR-RTO: 0.2
RBC # BLD AUTO: 3.74 M/UL (ref 4.05–5.2)
SODIUM SERPL-SCNC: 141 MMOL/L (ref 136–145)
URATE SERPL-MCNC: 4.5 MG/DL (ref 2.5–7.1)
WBC # BLD AUTO: 13.1 K/UL (ref 4.3–11.1)

## 2025-01-06 PROCEDURE — 99213 OFFICE O/P EST LOW 20 MIN: CPT | Performed by: NURSE PRACTITIONER

## 2025-01-06 NOTE — PROGRESS NOTES
review formal ultrasound report.      2. Single episode of elevated BP: Initial /83 recheck-->130/84. Denies any PreE symptoms. Pc Ratio: 0.1     3. Hyperemesis: 10/31/24 [facility]:  Taking Pepcid 20 mg q hs, occasional nausea mostly when first waking up in the morning.  Can increase Pepcid to BID prn. 12/02/24 [facility]:  Patient states nausea managed with pepcid.      4. Single episode of elevated BP: 12/02/24 [facility]:  /88. Reviewed Pre-eclampsia precautions. Monitor closely. High risk for worsening and development of HTN of pregnancy.     Physical Examination:  /72   Wt 78.9 kg (174 lb)   LMP 04/09/2024 (Exact Date)   BMI 30.07 kg/m²    Gen: AAOx3  Ab: soft, NTTP, gravid uterus  Skin: no edema, right arm noted in splint  SVE: ft/20/-3    Plan:  --GBS neg, discussed results with patient today.   --SVE: ft/20/-3  --BP reading today: 136/72. She denies PreE symptoms. 6lb weight gain noted from previous visit 2 weeks ago. Encouraged collecting Hellp labs to ensure wnl. She is agreeable with this.   --Hellp labs (Cbc, Cmp, Ld, Uric Acid and Pc Ratio) collected   --RTC on Wednesday, 1/8/2025 and in 1 week for rhea as scheduled on 1/13.     Strict PTL/labor precautions, FMC, and pregnancy warning signs reviewed. Pt advised to call the office at [phone] or go straight to Labor and Delivery at [facility] with any of the following concerns vaginal bleeding, leaking of fluid, jaret regularly Q 5-7 minutes for over an hour or not feeling the baby move.     Strict Kick counts and labor precautions reviewed     Strict Pre-eclampsia Precautions discussed with the patient including but not limited to: elevated blood pressure, increased swelling in hands/feet/face, persistent headache, visual changes, nausea/vomiting & right upper quadrant pain. Pt advised to call the office at [phone] or go straight to Labor and Delivery at [facility] [place] should any of the above occur.

## 2025-01-06 NOTE — PATIENT INSTRUCTIONS
PTL/labor precautions, FMC, and pregnancy warning signs reviewed. Pt advised to call the office at [phone] or go straight to Labor and Delivery at [facility] with any of the following concerns vaginal bleeding, leaking of fluid, jaret regularly Q 5-7 minutes for over an hour or not feeling the baby move.     Kick counts and labor precautions reviewed

## 2025-01-08 ENCOUNTER — ROUTINE PRENATAL (OUTPATIENT)
Dept: OBGYN CLINIC | Age: 25
End: 2025-01-08

## 2025-01-08 VITALS — DIASTOLIC BLOOD PRESSURE: 72 MMHG | BODY MASS INDEX: 29.9 KG/M2 | WEIGHT: 173 LBS | SYSTOLIC BLOOD PRESSURE: 124 MMHG

## 2025-01-08 DIAGNOSIS — R03.0 SINGLE EPISODE OF ELEVATED BLOOD PRESSURE: ICD-10-CM

## 2025-01-08 DIAGNOSIS — O99.343 MENTAL DISORDER AFFECTING PREGNANCY IN THIRD TRIMESTER: ICD-10-CM

## 2025-01-08 DIAGNOSIS — O21.0 HYPEREMESIS AFFECTING PREGNANCY, ANTEPARTUM: Primary | ICD-10-CM

## 2025-01-08 DIAGNOSIS — O09.93 HIGH-RISK PREGNANCY IN THIRD TRIMESTER: ICD-10-CM

## 2025-01-08 DIAGNOSIS — O09.293 HISTORY OF MISCARRIAGE, CURRENTLY PREGNANT, THIRD TRIMESTER: ICD-10-CM

## 2025-01-08 DIAGNOSIS — Z14.1 CYSTIC FIBROSIS CARRIER: ICD-10-CM

## 2025-01-08 NOTE — RESULT ENCOUNTER NOTE
PreE symptoms. Hellp labs collected at previous visit stable from Pre-eclampsia standpoint. Potassium level slightly low-3.4 and recommend increasing K+ into diet.     Pc Ratio: 0.2 and recommended collecting 24 hour urine.

## 2025-01-08 NOTE — PATIENT INSTRUCTIONS
PTL/labor precautions, FMC, and pregnancy warning signs reviewed. Pt advised to call the office at [phone] or go straight to Labor and Delivery at [facility] with any of the following concerns vaginal bleeding, leaking of fluid, jaret regularly Q 5-7 minutes for over an hour or not feeling the baby move.     Kick counts and labor precautions reviewed     Pre-eclampsia Precautions discussed with the patient including but not limited to: elevated blood pressure, increased swelling in hands/feet/face, persistent headache, visual changes, nausea/vomiting & right upper quadrant pain. Pt advised to call the office at [phone] or go straight to Labor and Delivery at [facility] [place] should any of the above occur.      X Size Of Lesion In Cm: 0

## 2025-01-08 NOTE — PROGRESS NOTES
Patient here for BP recheck today. BP reading in office: 124/72. She denies PreE symptoms. Hellp labs collected at previous visit stable from Pre-eclampsia standpoint. Potassium level slightly low-3.4 and recommend increasing K+ into diet.     Pc Ratio: 0.2 and recommended collecting 24 hour urine. Patient informed of results. Supplies given to collect 24 hour urine and encouraged her to collect and bring back prior to next visit. She is agreeable with this.     Pre-eclampsia Precautions discussed with the patient including but not limited to: elevated blood pressure, increased swelling in hands/feet/face, persistent headache, visual changes, nausea/vomiting & right upper quadrant pain. Pt advised to call the office at [phone] or go straight to Labor and Delivery at [facility] should any of the above occur.

## 2025-01-09 ENCOUNTER — LAB (OUTPATIENT)
Dept: OBGYN CLINIC | Age: 25
End: 2025-01-09

## 2025-01-09 DIAGNOSIS — R03.0 ELEVATED BLOOD PRESSURE READING: Primary | ICD-10-CM

## 2025-01-10 LAB
COLLECT DURATION TIME UR: 24 HR
COLLECT DURATION TIME UR: 24 HR
CREAT 24H CL BSA ADJ PNL UR+SERPL: 82 ML/MIN (ref 71–151)
CREAT 24H UR-MRATE: 1076 MG/24HR (ref 740–1570)
CREAT SERPL-MCNC: 0.86 MG/DL (ref 0.6–1.1)
CREAT UR-MCNC: 53.8 MG/DL (ref 28–217)
PROT 24H UR-MRATE: 200 MG/24HR (ref 0–150)
PROT UR-MCNC: 10 MG/DL
SPECIMEN VOL ?TM UR: 2000 ML
SPECIMEN VOL ?TM UR: 2000 ML

## 2025-01-13 ENCOUNTER — ROUTINE PRENATAL (OUTPATIENT)
Dept: OBGYN CLINIC | Age: 25
End: 2025-01-13
Payer: MEDICAID

## 2025-01-13 ENCOUNTER — TELEPHONE (OUTPATIENT)
Dept: OBGYN CLINIC | Age: 25
End: 2025-01-13

## 2025-01-13 VITALS — SYSTOLIC BLOOD PRESSURE: 124 MMHG | WEIGHT: 172 LBS | DIASTOLIC BLOOD PRESSURE: 74 MMHG | BODY MASS INDEX: 29.73 KG/M2

## 2025-01-13 DIAGNOSIS — O09.293 HISTORY OF MISCARRIAGE, CURRENTLY PREGNANT, THIRD TRIMESTER: ICD-10-CM

## 2025-01-13 DIAGNOSIS — S59.901A INJURY OF RIGHT ELBOW, INITIAL ENCOUNTER: ICD-10-CM

## 2025-01-13 DIAGNOSIS — Z14.1 CYSTIC FIBROSIS CARRIER: ICD-10-CM

## 2025-01-13 DIAGNOSIS — O09.93 HIGH-RISK PREGNANCY IN THIRD TRIMESTER: Primary | ICD-10-CM

## 2025-01-13 DIAGNOSIS — O99.343 MENTAL DISORDER AFFECTING PREGNANCY IN THIRD TRIMESTER: ICD-10-CM

## 2025-01-13 PROCEDURE — 99212 OFFICE O/P EST SF 10 MIN: CPT | Performed by: OBSTETRICS & GYNECOLOGY

## 2025-01-13 RX ORDER — METHYLERGONOVINE MALEATE 0.2 MG/ML
200 INJECTION INTRAVENOUS PRN
Status: CANCELLED | OUTPATIENT
Start: 2025-01-13

## 2025-01-13 RX ORDER — DEXTROSE, SODIUM CHLORIDE, SODIUM LACTATE, POTASSIUM CHLORIDE, AND CALCIUM CHLORIDE 5; .6; .31; .03; .02 G/100ML; G/100ML; G/100ML; G/100ML; G/100ML
INJECTION, SOLUTION INTRAVENOUS CONTINUOUS
Status: CANCELLED | OUTPATIENT
Start: 2025-01-13

## 2025-01-13 RX ORDER — SODIUM CHLORIDE, SODIUM LACTATE, POTASSIUM CHLORIDE, AND CALCIUM CHLORIDE .6; .31; .03; .02 G/100ML; G/100ML; G/100ML; G/100ML
1000 INJECTION, SOLUTION INTRAVENOUS PRN
Status: CANCELLED | OUTPATIENT
Start: 2025-01-13

## 2025-01-13 RX ORDER — FAMOTIDINE 20 MG/1
20 TABLET, FILM COATED ORAL 2 TIMES DAILY PRN
Status: CANCELLED | OUTPATIENT
Start: 2025-01-13

## 2025-01-13 RX ORDER — ONDANSETRON 4 MG/1
4 TABLET, ORALLY DISINTEGRATING ORAL EVERY 6 HOURS PRN
Status: CANCELLED | OUTPATIENT
Start: 2025-01-13

## 2025-01-13 RX ORDER — MISOPROSTOL 100 UG/1
400 TABLET ORAL PRN
Status: CANCELLED | OUTPATIENT
Start: 2025-01-13

## 2025-01-13 RX ORDER — TERBUTALINE SULFATE 1 MG/ML
0.25 INJECTION, SOLUTION SUBCUTANEOUS ONCE
Status: CANCELLED | OUTPATIENT
Start: 2025-01-13 | End: 2025-01-13

## 2025-01-13 RX ORDER — ONDANSETRON 2 MG/ML
4 INJECTION INTRAMUSCULAR; INTRAVENOUS EVERY 6 HOURS PRN
Status: CANCELLED | OUTPATIENT
Start: 2025-01-13

## 2025-01-13 RX ORDER — SODIUM CHLORIDE, SODIUM LACTATE, POTASSIUM CHLORIDE, AND CALCIUM CHLORIDE .6; .31; .03; .02 G/100ML; G/100ML; G/100ML; G/100ML
500 INJECTION, SOLUTION INTRAVENOUS PRN
Status: CANCELLED | OUTPATIENT
Start: 2025-01-13

## 2025-01-13 RX ORDER — SODIUM CHLORIDE 9 MG/ML
INJECTION, SOLUTION INTRAVENOUS PRN
Status: CANCELLED | OUTPATIENT
Start: 2025-01-13

## 2025-01-13 RX ORDER — SODIUM CHLORIDE 0.9 % (FLUSH) 0.9 %
5-40 SYRINGE (ML) INJECTION PRN
Status: CANCELLED | OUTPATIENT
Start: 2025-01-13

## 2025-01-13 RX ORDER — DOCUSATE SODIUM 100 MG/1
100 CAPSULE, LIQUID FILLED ORAL 2 TIMES DAILY
Status: CANCELLED | OUTPATIENT
Start: 2025-01-13

## 2025-01-13 RX ORDER — CARBOPROST TROMETHAMINE 250 UG/ML
250 INJECTION, SOLUTION INTRAMUSCULAR PRN
Status: CANCELLED | OUTPATIENT
Start: 2025-01-13

## 2025-01-13 RX ORDER — BUTORPHANOL TARTRATE 1 MG/ML
1 INJECTION, SOLUTION INTRAMUSCULAR; INTRAVENOUS
Status: CANCELLED | OUTPATIENT
Start: 2025-01-13

## 2025-01-13 RX ORDER — SODIUM CHLORIDE 0.9 % (FLUSH) 0.9 %
5-40 SYRINGE (ML) INJECTION EVERY 12 HOURS SCHEDULED
Status: CANCELLED | OUTPATIENT
Start: 2025-01-13

## 2025-01-13 SDOH — ECONOMIC STABILITY: FOOD INSECURITY: WITHIN THE PAST 12 MONTHS, YOU WORRIED THAT YOUR FOOD WOULD RUN OUT BEFORE YOU GOT MONEY TO BUY MORE.: NEVER TRUE

## 2025-01-13 SDOH — ECONOMIC STABILITY: FOOD INSECURITY: WITHIN THE PAST 12 MONTHS, THE FOOD YOU BOUGHT JUST DIDN'T LAST AND YOU DIDN'T HAVE MONEY TO GET MORE.: NEVER TRUE

## 2025-01-13 ASSESSMENT — PATIENT HEALTH QUESTIONNAIRE - PHQ9
1. LITTLE INTEREST OR PLEASURE IN DOING THINGS: NOT AT ALL
SUM OF ALL RESPONSES TO PHQ QUESTIONS 1-9: 0
SUM OF ALL RESPONSES TO PHQ9 QUESTIONS 1 & 2: 0
2. FEELING DOWN, DEPRESSED OR HOPELESS: NOT AT ALL

## 2025-01-13 NOTE — TELEPHONE ENCOUNTER
Induction scheduled with [facility].  Cytotec on 1/16, induction 1/17 with Dr Smith d/t elective.   Pt notified of instructions.

## 2025-01-13 NOTE — PROGRESS NOTES
OB return  Pau Hester is a 24 y.o. female   with 39w0d IUP with Estimated Date of Delivery: 25 presenting to the clinic as a routine OB.   Denies any complaints.  GBS negative    Problem list reviewed and updated    Pregnancy complicated by:  1. Anxiety and depression- stable, no meds    2. 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 Injury to the right elbow- on 2025, suppose to get splint off this Thursday    Physical Examination:  /74   Wt 78 kg (172 lb)   LMP 2024 (Exact Date)   BMI 29.73 kg/m²    Gen: AAOx3  Ab: soft, NTTP, gravid uterus  Cvx: 2  Skin: no edema    Plan:  --she desires IOL. She has an orthopedic appointment on Thursday and would like to be set up after this appointment. Discussed  PM with cytotec

## 2025-01-16 ENCOUNTER — OFFICE VISIT (OUTPATIENT)
Age: 25
End: 2025-01-16
Payer: MEDICAID

## 2025-01-16 ENCOUNTER — HOSPITAL ENCOUNTER (INPATIENT)
Age: 25
LOS: 3 days | Discharge: HOME | End: 2025-01-19
Attending: OBSTETRICS & GYNECOLOGY | Admitting: OBSTETRICS & GYNECOLOGY
Payer: COMMERCIAL

## 2025-01-16 ENCOUNTER — EVALUATION (OUTPATIENT)
Age: 25
End: 2025-01-16

## 2025-01-16 DIAGNOSIS — M25.621 STIFFNESS OF RIGHT ELBOW JOINT: Primary | ICD-10-CM

## 2025-01-16 DIAGNOSIS — S52.124A CLOSED NONDISPLACED FRACTURE OF HEAD OF RIGHT RADIUS, INITIAL ENCOUNTER: ICD-10-CM

## 2025-01-16 DIAGNOSIS — S53.104A CLOSED DISLOCATION OF RIGHT ELBOW, INITIAL ENCOUNTER: ICD-10-CM

## 2025-01-16 DIAGNOSIS — M25.521 RIGHT ELBOW PAIN: ICD-10-CM

## 2025-01-16 DIAGNOSIS — S53.104A CLOSED DISLOCATION OF RIGHT ELBOW, INITIAL ENCOUNTER: Primary | ICD-10-CM

## 2025-01-16 DIAGNOSIS — S53.124D CLOSED TRAUMATIC DISLOCATION OF POSTERIOR ELBOW JOINT, RIGHT, SUBSEQUENT ENCOUNTER: ICD-10-CM

## 2025-01-16 PROBLEM — Z34.90 ENCOUNTER FOR ELECTIVE INDUCTION OF LABOR: Status: ACTIVE | Noted: 2025-01-16

## 2025-01-16 LAB
BASOPHILS # BLD: 0.06 K/UL (ref 0–0.2)
BASOPHILS NFR BLD: 0.4 % (ref 0–2)
DIFFERENTIAL METHOD BLD: ABNORMAL
EOSINOPHIL # BLD: 0.04 K/UL (ref 0–0.8)
EOSINOPHIL NFR BLD: 0.3 % (ref 0.5–7.8)
ERYTHROCYTE [DISTWIDTH] IN BLOOD BY AUTOMATED COUNT: 13.8 % (ref 11.9–14.6)
HCT VFR BLD AUTO: 32.9 % (ref 35.8–46.3)
HGB BLD-MCNC: 11.7 G/DL (ref 11.7–15.4)
IMM GRANULOCYTES # BLD AUTO: 0.16 K/UL (ref 0–0.5)
IMM GRANULOCYTES NFR BLD AUTO: 1 % (ref 0–5)
LYMPHOCYTES # BLD: 1.55 K/UL (ref 0.5–4.6)
LYMPHOCYTES NFR BLD: 10.2 % (ref 13–44)
MCH RBC QN AUTO: 31.7 PG (ref 26.1–32.9)
MCHC RBC AUTO-ENTMCNC: 35.6 G/DL (ref 31.4–35)
MCV RBC AUTO: 89.2 FL (ref 82–102)
MONOCYTES # BLD: 1 K/UL (ref 0.1–1.3)
MONOCYTES NFR BLD: 6.5 % (ref 4–12)
NEUTS SEG # BLD: 12.46 K/UL (ref 1.7–8.2)
NEUTS SEG NFR BLD: 81.6 % (ref 43–78)
NRBC # BLD: 0 K/UL (ref 0–0.2)
PLATELET # BLD AUTO: 300 K/UL (ref 150–450)
PMV BLD AUTO: 9.9 FL (ref 9.4–12.3)
RBC # BLD AUTO: 3.69 M/UL (ref 4.05–5.2)
WBC # BLD AUTO: 15.3 K/UL (ref 4.3–11.1)

## 2025-01-16 PROCEDURE — 1100000000 HC RM PRIVATE

## 2025-01-16 PROCEDURE — 85025 COMPLETE CBC W/AUTO DIFF WBC: CPT

## 2025-01-16 PROCEDURE — 4A1HXCZ MONITORING OF PRODUCTS OF CONCEPTION, CARDIAC RATE, EXTERNAL APPROACH: ICD-10-PCS | Performed by: OBSTETRICS & GYNECOLOGY

## 2025-01-16 PROCEDURE — 86901 BLOOD TYPING SEROLOGIC RH(D): CPT

## 2025-01-16 PROCEDURE — 86900 BLOOD TYPING SEROLOGIC ABO: CPT

## 2025-01-16 PROCEDURE — 59200 INSERT CERVICAL DILATOR: CPT

## 2025-01-16 PROCEDURE — 86870 RBC ANTIBODY IDENTIFICATION: CPT

## 2025-01-16 PROCEDURE — 2580000003 HC RX 258: Performed by: OBSTETRICS & GYNECOLOGY

## 2025-01-16 PROCEDURE — 99213 OFFICE O/P EST LOW 20 MIN: CPT | Performed by: ORTHOPAEDIC SURGERY

## 2025-01-16 PROCEDURE — 86850 RBC ANTIBODY SCREEN: CPT

## 2025-01-16 PROCEDURE — 86780 TREPONEMA PALLIDUM: CPT

## 2025-01-16 PROCEDURE — 6370000000 HC RX 637 (ALT 250 FOR IP): Performed by: OBSTETRICS & GYNECOLOGY

## 2025-01-16 RX ORDER — SODIUM CHLORIDE 9 MG/ML
INJECTION, SOLUTION INTRAVENOUS PRN
Status: DISCONTINUED | OUTPATIENT
Start: 2025-01-16 | End: 2025-01-18

## 2025-01-16 RX ORDER — TERBUTALINE SULFATE 1 MG/ML
0.25 INJECTION, SOLUTION SUBCUTANEOUS ONCE
Status: DISCONTINUED | OUTPATIENT
Start: 2025-01-16 | End: 2025-01-18

## 2025-01-16 RX ORDER — SODIUM CHLORIDE 0.9 % (FLUSH) 0.9 %
5-40 SYRINGE (ML) INJECTION EVERY 12 HOURS SCHEDULED
Status: DISCONTINUED | OUTPATIENT
Start: 2025-01-16 | End: 2025-01-17 | Stop reason: SDUPTHER

## 2025-01-16 RX ORDER — METHYLERGONOVINE MALEATE 0.2 MG/ML
200 INJECTION INTRAVENOUS PRN
Status: DISCONTINUED | OUTPATIENT
Start: 2025-01-16 | End: 2025-01-17 | Stop reason: SDUPTHER

## 2025-01-16 RX ORDER — BUTORPHANOL TARTRATE 2 MG/ML
1 INJECTION, SOLUTION INTRAMUSCULAR; INTRAVENOUS
Status: DISCONTINUED | OUTPATIENT
Start: 2025-01-16 | End: 2025-01-18

## 2025-01-16 RX ORDER — SODIUM CHLORIDE, SODIUM LACTATE, POTASSIUM CHLORIDE, AND CALCIUM CHLORIDE .6; .31; .03; .02 G/100ML; G/100ML; G/100ML; G/100ML
500 INJECTION, SOLUTION INTRAVENOUS PRN
Status: DISCONTINUED | OUTPATIENT
Start: 2025-01-16 | End: 2025-01-18

## 2025-01-16 RX ORDER — DOCUSATE SODIUM 100 MG/1
100 CAPSULE, LIQUID FILLED ORAL 2 TIMES DAILY
Status: DISCONTINUED | OUTPATIENT
Start: 2025-01-16 | End: 2025-01-17 | Stop reason: SDUPTHER

## 2025-01-16 RX ORDER — SODIUM CHLORIDE 0.9 % (FLUSH) 0.9 %
5-40 SYRINGE (ML) INJECTION PRN
Status: DISCONTINUED | OUTPATIENT
Start: 2025-01-16 | End: 2025-01-17 | Stop reason: SDUPTHER

## 2025-01-16 RX ORDER — TRANEXAMIC ACID 10 MG/ML
1000 INJECTION, SOLUTION INTRAVENOUS
Status: ACTIVE | OUTPATIENT
Start: 2025-01-16 | End: 2025-01-17

## 2025-01-16 RX ORDER — SODIUM CHLORIDE, SODIUM LACTATE, POTASSIUM CHLORIDE, AND CALCIUM CHLORIDE .6; .31; .03; .02 G/100ML; G/100ML; G/100ML; G/100ML
1000 INJECTION, SOLUTION INTRAVENOUS PRN
Status: DISCONTINUED | OUTPATIENT
Start: 2025-01-16 | End: 2025-01-18

## 2025-01-16 RX ORDER — ONDANSETRON 2 MG/ML
4 INJECTION INTRAMUSCULAR; INTRAVENOUS EVERY 6 HOURS PRN
Status: DISCONTINUED | OUTPATIENT
Start: 2025-01-16 | End: 2025-01-17 | Stop reason: SDUPTHER

## 2025-01-16 RX ORDER — MISOPROSTOL 200 UG/1
400 TABLET ORAL PRN
Status: DISCONTINUED | OUTPATIENT
Start: 2025-01-16 | End: 2025-01-17 | Stop reason: SDUPTHER

## 2025-01-16 RX ORDER — ONDANSETRON 4 MG/1
4 TABLET, ORALLY DISINTEGRATING ORAL EVERY 6 HOURS PRN
Status: DISCONTINUED | OUTPATIENT
Start: 2025-01-16 | End: 2025-01-17 | Stop reason: SDUPTHER

## 2025-01-16 RX ORDER — FAMOTIDINE 20 MG/1
20 TABLET, FILM COATED ORAL 2 TIMES DAILY PRN
Status: DISCONTINUED | OUTPATIENT
Start: 2025-01-16 | End: 2025-01-18

## 2025-01-16 RX ORDER — DEXTROSE, SODIUM CHLORIDE, SODIUM LACTATE, POTASSIUM CHLORIDE, AND CALCIUM CHLORIDE 5; .6; .31; .03; .02 G/100ML; G/100ML; G/100ML; G/100ML; G/100ML
INJECTION, SOLUTION INTRAVENOUS CONTINUOUS
Status: DISCONTINUED | OUTPATIENT
Start: 2025-01-16 | End: 2025-01-18

## 2025-01-16 RX ORDER — CARBOPROST TROMETHAMINE 250 UG/ML
250 INJECTION, SOLUTION INTRAMUSCULAR PRN
Status: DISCONTINUED | OUTPATIENT
Start: 2025-01-16 | End: 2025-01-18

## 2025-01-16 RX ADMIN — SODIUM CHLORIDE, POTASSIUM CHLORIDE, SODIUM LACTATE AND CALCIUM CHLORIDE 500 ML: 600; 310; 30; 20 INJECTION, SOLUTION INTRAVENOUS at 22:20

## 2025-01-16 RX ADMIN — Medication 50 MCG: at 19:53

## 2025-01-16 NOTE — PROGRESS NOTES
[facility] ORTHOPAED[facility]  [address] [place] [id number]  Dept: [phone]      Occupational Therapy Initial Assessment     Referring MD: Mackenzie Saab MD    Diagnosis:     ICD-10-CM    1. Stiffness of right elbow joint  M25.621       2. Right elbow pain  M25.521       3. Closed traumatic dislocation of posterior elbow joint, right, subsequent encounter  S53.124D            Surgery: Date NA     Therapy precautions:  posterior elbow dislocation:  no forceful extension    History of injury/onset : Great Bhaskar was being held by the color and the dog yanked her, causing fall    Payor: Payor: [employer] /  /  /  Billing pattern: Government- total time   Total Direct Treatment Time: 45 min                       Total In Office Time: 60 min  Modifier needed: No  Episode visit count:  Visit count could not be calculated. Make sure you are using a visit which is associated with an episode.     Preferred Name:  SUSAN    PERTINENT MEDICAL HISTORY     PMHX & Meds:   Past Medical History:   Diagnosis Date    Anxiety and depression     not on medication, stable    Endometriosis 2023    diagnosed by laparoscopy    GERD (gastroesophageal reflux disease)     Rh incompatibility     Vitamin D deficiency 2021   ,   Past Surgical History:   Procedure Laterality Date    CHOLECYSTECTOMY  2023    LAPAROSCOPY  2023    x2      Medications. : Reviewed in chart  Allergies:   Allergies   Allergen Reactions    Cephalexin Itching     Rash to hands        SUBJECTIVE     Current Symptoms/Chief complaints: No chief complaint on file.      Chief complaint/history of injury:     Number of Therapy Visits within past 90 days: 0  Nature of condition: Recent onset (initial onset within last 3 months)  Primary cause of current episode: Traumatic  Date symptoms began: 1/1/25  How did symptoms start: yanked down by dog  Describe current symptoms: R elbow swelling, bruising, soreness, impaired ADL's    Average

## 2025-01-17 ENCOUNTER — HOSPITAL ENCOUNTER (INPATIENT)
Dept: LABOR AND DELIVERY | Age: 25
Discharge: HOME | End: 2025-01-20
Payer: COMMERCIAL

## 2025-01-17 ENCOUNTER — ANESTHESIA EVENT (OUTPATIENT)
Dept: LABOR AND DELIVERY | Age: 25
End: 2025-01-17
Payer: MEDICAID

## 2025-01-17 ENCOUNTER — ANESTHESIA (OUTPATIENT)
Dept: LABOR AND DELIVERY | Age: 25
End: 2025-01-17
Payer: MEDICAID

## 2025-01-17 LAB
BASE DEFICIT BLD-SCNC: 1 MMOL/L
BASE DEFICIT BLD-SCNC: 1.9 MMOL/L
HCO3 BLD-SCNC: 24.7 MMOL/L (ref 22–26)
HCO3 BLDV-SCNC: 25.5 MMOL/L (ref 23–28)
PCO2 BLDCO: 44 MMHG (ref 32–68)
PCO2 BLDCO: 53 MMHG (ref 32–68)
PH BLDCO: 7.29 (ref 7.15–7.38)
PH BLDCO: 7.36 (ref 7.15–7.38)
PO2 BLDCO: 27 MMHG
PO2 BLDCO: 35 MMHG
SAO2 % BLD: 65 % (ref 95–98)
SAO2 % BLDV: 43.3 % (ref 65–88)
SERVICE CMNT-IMP: ABNORMAL
SERVICE CMNT-IMP: ABNORMAL
SPECIMEN TYPE: ABNORMAL
SPECIMEN TYPE: ABNORMAL
T PALLIDUM AB SER QL IA: NONREACTIVE

## 2025-01-17 PROCEDURE — 6370000000 HC RX 637 (ALT 250 FOR IP): Performed by: OBSTETRICS & GYNECOLOGY

## 2025-01-17 PROCEDURE — 2580000003 HC RX 258: Performed by: OBSTETRICS & GYNECOLOGY

## 2025-01-17 PROCEDURE — 0KQM0ZZ REPAIR PERINEUM MUSCLE, OPEN APPROACH: ICD-10-PCS | Performed by: OBSTETRICS & GYNECOLOGY

## 2025-01-17 PROCEDURE — 99465 NB RESUSCITATION: CPT

## 2025-01-17 PROCEDURE — 7100000011 HC PHASE II RECOVERY - ADDTL 15 MIN

## 2025-01-17 PROCEDURE — 1100000000 HC RM PRIVATE

## 2025-01-17 PROCEDURE — 59409 OBSTETRICAL CARE: CPT | Performed by: OBSTETRICS & GYNECOLOGY

## 2025-01-17 PROCEDURE — 00HU33Z INSERTION OF INFUSION DEVICE INTO SPINAL CANAL, PERCUTANEOUS APPROACH: ICD-10-PCS | Performed by: ANESTHESIOLOGY

## 2025-01-17 PROCEDURE — 7100000010 HC PHASE II RECOVERY - FIRST 15 MIN

## 2025-01-17 PROCEDURE — 6360000002 HC RX W HCPCS: Performed by: OBSTETRICS & GYNECOLOGY

## 2025-01-17 PROCEDURE — 7220000101 HC DELIVERY VAGINAL/SINGLE

## 2025-01-17 PROCEDURE — 6360000002 HC RX W HCPCS: Performed by: STUDENT IN AN ORGANIZED HEALTH CARE EDUCATION/TRAINING PROGRAM

## 2025-01-17 PROCEDURE — 51701 INSERT BLADDER CATHETER: CPT

## 2025-01-17 PROCEDURE — 59200 INSERT CERVICAL DILATOR: CPT

## 2025-01-17 PROCEDURE — 7210000100 HC LABOR FEE PER 1 HR

## 2025-01-17 PROCEDURE — 36600 WITHDRAWAL OF ARTERIAL BLOOD: CPT

## 2025-01-17 PROCEDURE — 82803 BLOOD GASES ANY COMBINATION: CPT

## 2025-01-17 PROCEDURE — 3700000025 EPIDURAL BLOCK: Performed by: ANESTHESIOLOGY

## 2025-01-17 PROCEDURE — 6360000002 HC RX W HCPCS

## 2025-01-17 PROCEDURE — 10907ZC DRAINAGE OF AMNIOTIC FLUID, THERAPEUTIC FROM PRODUCTS OF CONCEPTION, VIA NATURAL OR ARTIFICIAL OPENING: ICD-10-PCS | Performed by: OBSTETRICS & GYNECOLOGY

## 2025-01-17 RX ORDER — ROPIVACAINE HYDROCHLORIDE 2 MG/ML
INJECTION, SOLUTION EPIDURAL; INFILTRATION; PERINEURAL
Status: DISCONTINUED | OUTPATIENT
Start: 2025-01-17 | End: 2025-01-17 | Stop reason: SDUPTHER

## 2025-01-17 RX ORDER — ONDANSETRON 2 MG/ML
4 INJECTION INTRAMUSCULAR; INTRAVENOUS EVERY 6 HOURS PRN
Status: DISCONTINUED | OUTPATIENT
Start: 2025-01-17 | End: 2025-01-19 | Stop reason: HOSPADM

## 2025-01-17 RX ORDER — METHYLERGONOVINE MALEATE 0.2 MG/ML
200 INJECTION INTRAVENOUS PRN
Status: DISCONTINUED | OUTPATIENT
Start: 2025-01-17 | End: 2025-01-19 | Stop reason: HOSPADM

## 2025-01-17 RX ORDER — SODIUM CHLORIDE, SODIUM LACTATE, POTASSIUM CHLORIDE, CALCIUM CHLORIDE 600; 310; 30; 20 MG/100ML; MG/100ML; MG/100ML; MG/100ML
INJECTION, SOLUTION INTRAVENOUS CONTINUOUS
Status: DISCONTINUED | OUTPATIENT
Start: 2025-01-17 | End: 2025-01-18

## 2025-01-17 RX ORDER — ROPIVACAINE HYDROCHLORIDE 5 MG/ML
INJECTION, SOLUTION EPIDURAL; INFILTRATION; PERINEURAL
Status: DISCONTINUED | OUTPATIENT
Start: 2025-01-17 | End: 2025-01-17 | Stop reason: SDUPTHER

## 2025-01-17 RX ORDER — OXYCODONE HYDROCHLORIDE 5 MG/1
5 TABLET ORAL EVERY 4 HOURS PRN
Status: DISCONTINUED | OUTPATIENT
Start: 2025-01-17 | End: 2025-01-19 | Stop reason: HOSPADM

## 2025-01-17 RX ORDER — SODIUM CHLORIDE 0.9 % (FLUSH) 0.9 %
5-40 SYRINGE (ML) INJECTION PRN
Status: DISCONTINUED | OUTPATIENT
Start: 2025-01-17 | End: 2025-01-19 | Stop reason: HOSPADM

## 2025-01-17 RX ORDER — SODIUM CHLORIDE 9 MG/ML
INJECTION, SOLUTION INTRAVENOUS PRN
Status: DISCONTINUED | OUTPATIENT
Start: 2025-01-17 | End: 2025-01-19 | Stop reason: HOSPADM

## 2025-01-17 RX ORDER — SODIUM CHLORIDE 0.9 % (FLUSH) 0.9 %
5-40 SYRINGE (ML) INJECTION EVERY 12 HOURS SCHEDULED
Status: DISCONTINUED | OUTPATIENT
Start: 2025-01-17 | End: 2025-01-18

## 2025-01-17 RX ORDER — IBUPROFEN 800 MG/1
800 TABLET, FILM COATED ORAL EVERY 8 HOURS
Status: DISCONTINUED | OUTPATIENT
Start: 2025-01-17 | End: 2025-01-19 | Stop reason: HOSPADM

## 2025-01-17 RX ORDER — DOCUSATE SODIUM 100 MG/1
100 CAPSULE, LIQUID FILLED ORAL 2 TIMES DAILY
Status: DISCONTINUED | OUTPATIENT
Start: 2025-01-17 | End: 2025-01-19 | Stop reason: HOSPADM

## 2025-01-17 RX ORDER — LANOLIN
CREAM (ML) TOPICAL PRN
Status: DISCONTINUED | OUTPATIENT
Start: 2025-01-17 | End: 2025-01-19 | Stop reason: HOSPADM

## 2025-01-17 RX ORDER — ACETAMINOPHEN 500 MG
1000 TABLET ORAL EVERY 8 HOURS
Status: DISCONTINUED | OUTPATIENT
Start: 2025-01-17 | End: 2025-01-19 | Stop reason: HOSPADM

## 2025-01-17 RX ORDER — FENTANYL CITRATE 50 UG/ML
INJECTION, SOLUTION INTRAMUSCULAR; INTRAVENOUS
Status: DISCONTINUED | OUTPATIENT
Start: 2025-01-17 | End: 2025-01-17 | Stop reason: SDUPTHER

## 2025-01-17 RX ORDER — MISOPROSTOL 200 UG/1
200 TABLET ORAL EVERY 6 HOURS PRN
Status: DISCONTINUED | OUTPATIENT
Start: 2025-01-17 | End: 2025-01-19 | Stop reason: HOSPADM

## 2025-01-17 RX ORDER — ONDANSETRON 4 MG/1
4 TABLET, ORALLY DISINTEGRATING ORAL EVERY 6 HOURS PRN
Status: DISCONTINUED | OUTPATIENT
Start: 2025-01-17 | End: 2025-01-19 | Stop reason: HOSPADM

## 2025-01-17 RX ADMIN — OXYCODONE 5 MG: 5 TABLET ORAL at 23:42

## 2025-01-17 RX ADMIN — Medication: at 21:12

## 2025-01-17 RX ADMIN — SODIUM CHLORIDE, SODIUM LACTATE, POTASSIUM CHLORIDE, CALCIUM CHLORIDE AND DEXTROSE MONOHYDRATE: 5; 600; 310; 30; 20 INJECTION, SOLUTION INTRAVENOUS at 06:26

## 2025-01-17 RX ADMIN — Medication 50 MCG: at 00:35

## 2025-01-17 RX ADMIN — MISOPROSTOL 200 MCG: 200 TABLET ORAL at 23:05

## 2025-01-17 RX ADMIN — WITCH HAZEL: 500 SOLUTION RECTAL; TOPICAL at 21:12

## 2025-01-17 RX ADMIN — SODIUM CHLORIDE, SODIUM LACTATE, POTASSIUM CHLORIDE, CALCIUM CHLORIDE AND DEXTROSE MONOHYDRATE: 5; 600; 310; 30; 20 INJECTION, SOLUTION INTRAVENOUS at 14:30

## 2025-01-17 RX ADMIN — ACETAMINOPHEN 1000 MG: 500 TABLET, FILM COATED ORAL at 21:12

## 2025-01-17 RX ADMIN — FENTANYL CITRATE 100 MCG: 50 INJECTION, SOLUTION INTRAMUSCULAR; INTRAVENOUS at 16:59

## 2025-01-17 RX ADMIN — ONDANSETRON 4 MG: 4 TABLET, ORALLY DISINTEGRATING ORAL at 23:46

## 2025-01-17 RX ADMIN — ROPIVACAINE HYDROCHLORIDE 5 ML: 5 INJECTION, SOLUTION EPIDURAL; INFILTRATION; PERINEURAL at 05:29

## 2025-01-17 RX ADMIN — Medication 166.7 ML: at 18:14

## 2025-01-17 RX ADMIN — ONDANSETRON 4 MG: 2 INJECTION, SOLUTION INTRAMUSCULAR; INTRAVENOUS at 17:17

## 2025-01-17 RX ADMIN — ROPIVACAINE HYDROCHLORIDE 3 ML: 2 INJECTION, SOLUTION EPIDURAL; INFILTRATION at 05:29

## 2025-01-17 RX ADMIN — BUTORPHANOL TARTRATE 1 MG: 2 INJECTION, SOLUTION INTRAMUSCULAR; INTRAVENOUS at 02:34

## 2025-01-17 RX ADMIN — ONDANSETRON 4 MG: 2 INJECTION, SOLUTION INTRAMUSCULAR; INTRAVENOUS at 05:14

## 2025-01-17 RX ADMIN — METHYLERGONOVINE MALEATE 200 MCG: 0.2 INJECTION, SOLUTION INTRAMUSCULAR; INTRAVENOUS at 23:06

## 2025-01-17 RX ADMIN — OXYCODONE 5 MG: 5 TABLET ORAL at 19:16

## 2025-01-17 RX ADMIN — IBUPROFEN 800 MG: 800 TABLET, FILM COATED ORAL at 19:16

## 2025-01-17 RX ADMIN — OXYTOCIN 1 MILLI-UNITS/MIN: 10 INJECTION, SOLUTION INTRAMUSCULAR; INTRAVENOUS at 06:26

## 2025-01-17 RX ADMIN — ROPIVACAINE HYDROCHLORIDE 10 ML/HR: 2 INJECTION, SOLUTION EPIDURAL; INFILTRATION at 05:30

## 2025-01-17 ASSESSMENT — PAIN SCALES - GENERAL
PAINLEVEL_OUTOF10: 10
PAINLEVEL_OUTOF10: 6
PAINLEVEL_OUTOF10: 6

## 2025-01-17 ASSESSMENT — PAIN DESCRIPTION - ORIENTATION: ORIENTATION: LOWER

## 2025-01-17 ASSESSMENT — PAIN DESCRIPTION - LOCATION
LOCATION: ABDOMEN;VAGINA
LOCATION: PERINEUM

## 2025-01-17 ASSESSMENT — PAIN DESCRIPTION - DESCRIPTORS: DESCRIPTORS: BURNING;ACHING;CRAMPING

## 2025-01-17 NOTE — PROGRESS NOTES
elbow range of motion with therapy today. She will receive a custom brace which she should wear at night and in unprotected environments. I recommended she use the brace during her delivery; she is schedule to be induced later this evening. She can perform range of motion as tolerated, but should avoid lifting/weight bearing with the right arm. She will follow up in 4 weeks     Patient voiced accordance and understanding of the information provided and the formulated plan. All questions were answered to the patient's satisfaction during the encounter.      Mackenzie Saab MD  Orthopaedic Surgery  01/17/25  1:53 PM

## 2025-01-17 NOTE — L&D DELIVERY NOTE
[facility]  [address], [place] [address]  [phone]    Ramos Smith MD, FACOG  Maria Del Carmen Luna EMANUEL-BC  Delivery Note    Present for entire delivery.  Details in delivery summary.    .  Viable male infant, APGARS 7,8 .   Weight 8 lbs., 13 oz.    EBL:  250 mL  Pain mgmt:   epidural    Shoulder dystocia encountered.  Resolved with Maryjane, suprapubic pressure with delivery within 20 seconds.    Placenta spont, intact, 3VC.   2nd degree midline perineal laceration repaired in usual fashion with 2-0 vicryl, 3-0 vicryl rapide    Pt and infant stable.  NICU present for delivery.  Cord gases sent      Ramos Smith MD   6:44 PM  25

## 2025-01-17 NOTE — ANESTHESIA PRE PROCEDURE
Department of Anesthesiology  Preprocedure Note       Name:  Pau Hester   Age:  24 y.o.  :  2000                                          MRN:  [id number]         Date:  2025      Surgeon: * No surgeons listed *    Procedure: * No procedures listed *    Medications prior to admission:   Prior to Admission medications    Medication Sig Start Date End Date Taking? Authorizing Provider   acetaminophen (TYLENOL) 325 MG tablet Take 1 tablet by mouth every 6 hours as needed for Pain   Yes Espinoza Snell MD   famotidine (PEPCID) 20 MG tablet Take 1 tablet by mouth 2 times daily   Yes Espinoza Snell MD   Prenatal Vit-Fe Fum-FA-Omega (ONE-A-DAY WOMENS PRENATAL) 28-0.8 & 440 MG MISC Take 1 tablet by mouth daily 24  Yes Yaa Rubin MD   calcium carbonate (TUMS) 500 MG chewable tablet Take 1 tablet by mouth daily  Patient not taking: Reported on 2025    Espinoza Snell MD   ondansetron (ZOFRAN-ODT) 4 MG disintegrating tablet Place 1 tablet inside cheek 3 times daily as needed for Nausea or Vomiting  Patient not taking: Reported on 24   Justin Haque DO   Ferrous Sulfate (IRON PO) Take by mouth    Espinoza Snell MD   Fish Oil-Cholecalciferol (OMEGA-3 + D PO) Take by mouth  Patient not taking: Reported on 2025    Espinoza Snell MD       Current medications:    Current Facility-Administered Medications   Medication Dose Route Frequency Provider Last Rate Last Admin   • dextrose 5 % in lactated ringers infusion   IntraVENous Continuous Mariola Sanchez MD       • lactated ringers bolus 500 mL  500 mL IntraVENous PRN Mariola Sanchez MD   Stopped at 25 0000    Or   • lactated ringers bolus 1,000 mL  1,000 mL IntraVENous PRN Mariola Sanchez MD       • sodium chloride flush 0.9 % injection 5-40 mL  5-40 mL IntraVENous 2 times per day Mariola Sanchez MD       • sodium chloride flush 0.9 % injection 5-40

## 2025-01-17 NOTE — H&P
Sexual activity: Yes     Partners: Male   Other Topics Concern    Not on file   Social History Narrative    Not on file     Social Determinants of Health     Financial Resource Strain: Low Risk  (6/27/2024)    Overall Financial Resource Strain (CARDIA)     Difficulty of Paying Living Expenses: Not hard at all   Food Insecurity: No Food Insecurity (1/13/2025)    Hunger Vital Sign     Worried About Running Out of Food in the Last Year: Never true     Ran Out of Food in the Last Year: Never true   Transportation Needs: No Transportation Needs (1/13/2025)    PRAPARE - Transportation     Lack of Transportation (Medical): No     Lack of Transportation (Non-Medical): No   Physical Activity: Insufficiently Active (12/29/2023)    Received from [employer]    Physical Activity     Days of Exercise per Week: 4     Minutes of Exercise per Session: 10     Total Minutes of Exercise per Week: 40   Stress: No Stress Concern Present (12/29/2023)    Received from [employer]    Stress     Feeling of Stress : Only a little   Social Connections: Moderately Integrated (12/29/2023)    Received from [employer]    Social Connections     Frequency of Communication with Friends and Family: More than three times a week     Frequency of Social Gatherings with Friends and Family: Once a week   Intimate Partner Violence: Unknown (9/16/2023)    Received from [employer]    Intimate Partner Violence     Fear of Current or Ex-Partner: Not asked     Emotionally Abused: Not asked     Physically Abused: Not asked     Sexually Abused: Not asked   Housing Stability: Unknown (1/13/2025)    Housing Stability Vital Sign     Unable to Pay for Housing in the Last Year: No     Number of Times Moved in the Last Year: Not on file     Homeless in the Last Year: No       Allergies   Allergen Reactions    Cephalexin Itching     Rash to hands         Review of Systems    Constitutional: No

## 2025-01-17 NOTE — L&D DELIVERY NOTE
MirSHANTI [[id number]]      Labor Events     Labor: No  Cervical Ripening Date/Time:        Rupture Date/Time:  25 08:30:00   Rupture Type: AROM  Fluid Color: Meconium  Fluid Odor: None  Induction: Cervidil  Augmentation: AROM, Oxytocin  Labor Complications: Shoulder Dystocia       Anesthesia    Method: Epidural       Labor Event Times      Labor onset date/time:   10:00:00     Dilation complete date/time:        Start pushing date/time:  2025 16:06:00   Decision date/time (emergent ):            Delivery Details      Delivery Date: 25 Delivery Time: 18:09:00   Delivery Type: Vaginal, Spontaneous               Presentation    Presentation: Vertex  Position: Left  _: Occiput  _: Anterior       Shoulder Dystocia    Shoulder Dystocia Present?: Yes  Anterior Shoulder: Right    Time Recognized: 2025 18:08:50    Gentle Attempt at Traction, Assisted by Maternal Expulsive Forces?: Yes  First Maneuver: Head of Bed Down, Maryjane Maneuver, Suprapubic Pressure   Time Performed: 2025 18:08:50    Performed By: Milly Segovia RN           Assisted Delivery Details    Forceps Attempted?: No  Vacuum Extractor Attempted?: No                           Cord    Vessels: 3 Vessels  Complications: None  Delayed Cord Clamping?: Yes  Gases Sent?: Yes              Placenta    Date/Time: 2025 18:14:00  Removal: Spontaneous  Appearance: Intact  Disposition: Discarded       Lacerations    Episiotomy: None  Perineal Lacerations: 2nd  Number of Repair Packets: 2       Vaginal Counts    Initial Count Personnel: SEBASTIÁN AGUIRRE  Initial Count Verified By: MILLY BUSTOS RN  Intial Sponge Count: Correct Intial Needles Count: Correct Intial Instruments Count: Correct   Final Sponges Count: Correct Final Needles  Count: Correct Final Instruments Count: Correct   Final Count Personnel: SAME  Final Count Verified By: SAME  Accurate Final Count?: Yes       Blood Loss  Mother:

## 2025-01-17 NOTE — PROGRESS NOTES
[facility]  [address], [place] [address]  [phone]    Ramos Smith MD, FACOG  Maria Del Carmen Luna EMANUEL-BC    Labor Progress Note    Pt tolerating induction/labor well.    Vitals:    25 0659 25 0714 25 0729 25 0744   BP: 110/60 116/70 109/61 121/71   Pulse: 97 (!) 105 (!) 111 (!) 120   Resp:       Temp:       TempSrc:       SpO2:           FHT's:  Baseline 's, reactive  [place]:  ctx q 3-4 min    SVE:  3/80/-2  Membranes:  AROM - mec    Assessement and Plan: Pau Hester 24 y.o.  at 39w4d admitted for term IOL    Continue pitocin augmentation     Pain control: epidural    GBS: luz marina Smith MD  8:34 AM  25

## 2025-01-17 NOTE — ANESTHESIA PROCEDURE NOTES
Epidural Block    Patient location during procedure: OB  Start time: 1/17/2025 5:19 AM  End time: 1/17/2025 5:23 AM  Reason for block: labor epidural  Staffing  Performed: anesthesiologist   Anesthesiologist: Kathryn Barrera MD  Performed by: Kathryn Barrera MD  Authorized by: Kathryn Barrera MD    Epidural  Patient position: sitting  Prep: ChloraPrep  Patient monitoring: continuous pulse ox and frequent blood pressure checks  Approach: midline  Location: L3-4  Injection technique: LA saline  Guidance: paresthesia technique  Provider prep: sterile gloves and mask  Needle  Needle type: Tuohy   Needle gauge: 17 G  Needle length: 3.5 in  Needle insertion depth: 5.5 cm  Catheter type: side hole  Catheter size: 19 G  Catheter at skin depth: 10.5 cm  Test dose: negativeCatheter Secured: tegaderm  Assessment  Sensory level: T8  Hemodynamics: stable  Attempts: 1  Outcomes: uncomplicated  Preanesthetic Checklist  Completed: patient identified, IV checked, site marked, risks and benefits discussed, surgical/procedural consents, equipment checked, pre-op evaluation, timeout performed, anesthesia consent given, oxygen available, monitors applied/VS acknowledged, fire risk safety assessment completed and verbalized and blood product R/B/A discussed and consented

## 2025-01-18 PROCEDURE — 6370000000 HC RX 637 (ALT 250 FOR IP): Performed by: OBSTETRICS & GYNECOLOGY

## 2025-01-18 PROCEDURE — 1100000000 HC RM PRIVATE

## 2025-01-18 RX ADMIN — DOCUSATE SODIUM 100 MG: 100 CAPSULE, LIQUID FILLED ORAL at 07:58

## 2025-01-18 RX ADMIN — ACETAMINOPHEN 1000 MG: 500 TABLET, FILM COATED ORAL at 23:50

## 2025-01-18 RX ADMIN — OXYCODONE 5 MG: 5 TABLET ORAL at 07:58

## 2025-01-18 RX ADMIN — IBUPROFEN 800 MG: 800 TABLET, FILM COATED ORAL at 12:38

## 2025-01-18 RX ADMIN — ACETAMINOPHEN 1000 MG: 500 TABLET, FILM COATED ORAL at 15:03

## 2025-01-18 RX ADMIN — ACETAMINOPHEN 1000 MG: 500 TABLET, FILM COATED ORAL at 07:59

## 2025-01-18 RX ADMIN — MISOPROSTOL 200 MCG: 200 TABLET ORAL at 04:54

## 2025-01-18 RX ADMIN — IBUPROFEN 800 MG: 800 TABLET, FILM COATED ORAL at 02:47

## 2025-01-18 RX ADMIN — IBUPROFEN 800 MG: 800 TABLET, FILM COATED ORAL at 20:12

## 2025-01-18 RX ADMIN — OXYCODONE 5 MG: 5 TABLET ORAL at 03:30

## 2025-01-18 RX ADMIN — DOCUSATE SODIUM 100 MG: 100 CAPSULE, LIQUID FILLED ORAL at 20:13

## 2025-01-18 ASSESSMENT — PAIN DESCRIPTION - DESCRIPTORS: DESCRIPTORS: ACHING

## 2025-01-18 ASSESSMENT — PAIN SCALES - GENERAL
PAINLEVEL_OUTOF10: 2
PAINLEVEL_OUTOF10: 5
PAINLEVEL_OUTOF10: 5

## 2025-01-18 ASSESSMENT — PAIN DESCRIPTION - LOCATION: LOCATION: PERINEUM

## 2025-01-18 NOTE — PROGRESS NOTES
[facility]  [address] [facility] [address] [facility] [address], [place] [address]  [phone]    Ramos Smith MD, FACOG  RAYO Hunt-BC    Patient is S/P vaginal delivery at 39 4/7 weeks, term IOL. No complaints today.  Lochia < menses.  No GI/ issues.  No F/C.    VITALS  Patient Vitals for the past 24 hrs:   BP Temp Temp src Pulse Resp SpO2   01/18/25 0700 133/80 98.5 °F (36.9 °C) Oral 99 17 99 %   01/18/25 0329 132/85 98.6 °F (37 °C) Oral (!) 109 18 96 %   01/17/25 2310 123/85 -- -- (!) 110 16 97 %   01/17/25 2100 -- -- -- (!) 117 -- --   01/17/25 2030 135/86 98 °F (36.7 °C) Oral (!) 132 18 95 %   01/17/25 2004 122/84 -- -- (!) 133 -- --   01/17/25 1922 132/76 -- -- (!) 104 -- --   01/17/25 1907 (!) 140/80 -- -- (!) 107 -- --   01/17/25 1858 -- -- -- (!) 106 -- 94 %   01/17/25 1852 (!) 151/87 -- -- -- -- --   01/17/25 1851 -- -- -- (!) 125 -- 93 %   01/17/25 1837 (!) 140/85 -- -- (!) 111 -- --   01/17/25 1830 -- -- -- -- -- 94 %   01/17/25 1822 135/83 -- -- (!) 121 -- --   01/17/25 1806 (!) 146/72 -- -- (!) 146 -- --   01/17/25 1800 (!) 141/94 -- -- (!) 117 -- --   01/17/25 1759 -- -- -- (!) 127 -- 94 %   01/17/25 1753 -- -- -- (!) 114 -- 93 %   01/17/25 1745 139/72 -- -- (!) 126 -- --   01/17/25 1728 (!) 143/83 -- -- (!) 118 -- --   01/17/25 1727 -- -- -- (!) 127 -- 94 %   01/17/25 1714 (!) 140/80 -- -- (!) 115 -- --   01/17/25 1658 (!) 142/75 -- -- (!) 123 -- --   01/17/25 1646 133/74 -- -- (!) 133 -- --   01/17/25 1630 (!) 156/125 -- -- (!) 160 -- --   01/17/25 1613 (!) 148/74 -- -- (!) 141 -- --   01/17/25 1559 (!) 147/72 -- -- (!) 157 -- --   01/17/25 1543 (!) 151/75 -- -- (!) 131 -- --   01/17/25 1528 134/80 -- -- (!) 131 -- --   01/17/25 1458 129/70 -- -- (!) 114 -- --   01/17/25 1444 133/74 -- -- 100 -- --   01/17/25 1428 134/70 -- -- 97 -- --   01/17/25 1414 134/74 -- -- (!) 102 -- --   01/17/25 1359 133/83 -- -- (!) 103 -- --   01/17/25 1343 124/80 -- -- 95 -- --   01/17/25 1330 131/72 98.1 °F (36.7

## 2025-01-18 NOTE — PROGRESS NOTES
Dr. Smith made aware of increased bleeding since admission to [facility]. Pt has had steady trickle but fundus has remained firm and midline. Pt up to void and accidentally flushed clot before RN could visualize. Verbal order received to give IM methergine and one dose of cytotec 200mg now. Read back. Will recheck bleeding and administer PRN bleeding meds as needed.

## 2025-01-18 NOTE — LACTATION NOTE
This note was copied from a baby's chart.  Assisted with breastfeeding in an attempt in laid back on L and then in on football.  Mom has limited use of R arm due to recent elbow injury.  Baby fed L in football fairly well with assistance of breast compression to get deeper latch.  Demonstrated manual lip flange.  Encouraged frequent feeding and watch output.  Measured nipple diameter for flange fit at mom's request.  15mm L and 15mm R.  Discussed standard flanges may be ok, but new information states closer flange fit can be more comfortable and effective.  Mom can try flanges that came with pump and adjust as indicated.  Nipple diameter can fluctuate throughout breastfeeding duration.  Suggest trying 15-17mm flanges to start if standard 24mm do not work.      First visit with breastfeeding mom.  Discussed feeding baby on cue.  Opening mouth, turning head from side to side, bringing hands to mouth and sucking movements are all early hunger cues.  Crying is a late hunger cue.  Do lots of skin to skin to help recognize early feeding cues.  If baby does not nurse, continue skin to skin and offer again later.  On average newborns eat at least 8 or more times per day without restriction. Cluster feeding is normal.  Reviewed positioning techniques and signs of a good latch.  Discussed holding baby so that ear, shoulder and hip are in a straight line.  Baby is held close and nose lines up with mom's nipple.  Discussed supporting baby's neck and mom's breast.  When baby opens wide bring baby quickly onto the breast.  Try for an assymetrical latch with nipple pointed towards the roof of baby's mouth.  Mouth should be wide and lips flanged around the breast.  Encouraged to nurse on the first breast until baby finishes that side.  If baby comes off the breast quickly, you can re-offer that same side to ensure good stimulation before moving baby to next side.  Offer the second breast, baby can take the second breast as long as

## 2025-01-18 NOTE — ANESTHESIA POSTPROCEDURE EVALUATION
Department of Anesthesiology  Postprocedure Note    Patient: Pau Hester  MRN: [id number]  YOB: 2000  Date of evaluation: 1/17/2025    Procedure Summary       Date: 01/17/25 Room / Location:     Anesthesia Start: 0512 Anesthesia Stop: 1809    Procedure: Labor Analgesia Diagnosis:     Scheduled Providers:  Responsible Provider: ARLET Mendiola MD    Anesthesia Type: epidural ASA Status: 2            Anesthesia Type: No value filed.    Yoel Phase I:      Yoel Phase II:      Anesthesia Post Evaluation    Patient location during evaluation: bedside  Patient participation: complete - patient participated  Level of consciousness: awake and alert  Airway patency: patent  Nausea & Vomiting: no nausea and no vomiting  Cardiovascular status: hemodynamically stable  Respiratory status: acceptable  Hydration status: euvolemic  Comments: Blood pressure 132/76, pulse (!) 104, temperature 98.1 °F (36.7 °C), temperature source Oral, resp. rate 15, last menstrual period 04/09/2024, SpO2 94%, unknown if currently breastfeeding.    No apparent anesthetic complications.  No residual neuro deficits. Pt satisfied with epidural analgesia.  Multimodal analgesia pain management approach  Pain management: adequate    No notable events documented.

## 2025-01-19 VITALS
DIASTOLIC BLOOD PRESSURE: 73 MMHG | TEMPERATURE: 97.7 F | HEART RATE: 85 BPM | RESPIRATION RATE: 18 BRPM | SYSTOLIC BLOOD PRESSURE: 119 MMHG | OXYGEN SATURATION: 97 %

## 2025-01-19 PROCEDURE — 6370000000 HC RX 637 (ALT 250 FOR IP): Performed by: OBSTETRICS & GYNECOLOGY

## 2025-01-19 RX ORDER — IBUPROFEN 600 MG/1
600 TABLET, FILM COATED ORAL EVERY 6 HOURS PRN
Qty: 60 TABLET | Refills: 0 | Status: SHIPPED | OUTPATIENT
Start: 2025-01-19

## 2025-01-19 RX ORDER — SIMETHICONE 80 MG
80 TABLET,CHEWABLE ORAL EVERY 6 HOURS PRN
Status: DISCONTINUED | OUTPATIENT
Start: 2025-01-19 | End: 2025-01-19 | Stop reason: HOSPADM

## 2025-01-19 RX ORDER — HYDROCODONE BITARTRATE AND ACETAMINOPHEN 5; 325 MG/1; MG/1
1 TABLET ORAL EVERY 6 HOURS PRN
Qty: 18 TABLET | Refills: 0 | Status: SHIPPED | OUTPATIENT
Start: 2025-01-19 | End: 2025-01-24

## 2025-01-19 RX ORDER — PSEUDOEPHEDRINE HCL 30 MG
100 TABLET ORAL 2 TIMES DAILY
Qty: 60 CAPSULE | Refills: 1 | Status: SHIPPED | OUTPATIENT
Start: 2025-01-19

## 2025-01-19 RX ADMIN — WITCH HAZEL: 500 SOLUTION RECTAL; TOPICAL at 13:32

## 2025-01-19 RX ADMIN — SIMETHICONE 80 MG: 80 TABLET, CHEWABLE ORAL at 11:38

## 2025-01-19 RX ADMIN — IBUPROFEN 800 MG: 800 TABLET, FILM COATED ORAL at 03:30

## 2025-01-19 RX ADMIN — Medication: at 13:32

## 2025-01-19 RX ADMIN — IBUPROFEN 800 MG: 800 TABLET, FILM COATED ORAL at 11:36

## 2025-01-19 RX ADMIN — DOCUSATE SODIUM 100 MG: 100 CAPSULE, LIQUID FILLED ORAL at 11:36

## 2025-01-19 NOTE — PROGRESS NOTES
Patient discharged to home per MD orders.  Discharge instructions reviewed with patient. Questions encouraged and answered. Patient verbalizes understanding.       Patient to call out when ready to be escorted out

## 2025-01-19 NOTE — DISCHARGE INSTRUCTIONS
groin.  Swelling in the leg or groin.  A color change on the leg or groin. The skin may be reddish or purplish, depending on your usual skin color.     You have signs of preeclampsia, such as:  Sudden swelling of your face, hands, or feet.  New vision problems (such as dimness, blurring, or seeing spots).  A severe headache.     You have signs of heart failure, such as:  New or increased shortness of breath.  New or worse swelling in your legs, ankles, or feet.  Sudden weight gain, such as more than 2 to 3 pounds in a day or 5 pounds in a week.  Feeling so tired or weak that you cannot do your usual activities.     You had spinal or epidural pain relief and have:  New or worse back pain.  Increased pain, swelling, warmth, or redness at the injection site.  Tingling, weakness, or numbness in your legs or groin.   Watch closely for changes in your health, and be sure to contact your doctor or midwife if:    Your vaginal bleeding isn't decreasing.     You feel sad, anxious, or hopeless for more than a few days.     You are having problems with your breasts or breastfeeding.   Where can you learn more?  Go to https://www.[employer].net/patientEd and enter A461 to learn more about \"After Your Delivery (the Postpartum Period): Care Instructions.\"  Current as of: April 30, 2024  Content Version: 14.3  © 2024 [employer].   Care instructions adapted under license by [employer]. If you have questions about a medical condition or this instruction, always ask your healthcare professional. [employer], disclaims any warranty or liability for your use of this information.    DISCHARGE SUMMARY from Nurse    What to do at Home:    Nothing in vagina for 6 week, No tub baths or swimming, showers only  Call MD office or go to the ER if experiencing fever (greater than 100.4 deg F), heavy vaginal bleeding greater than full soaking of 1 pad per hour x 2 or more hours, foul smelling vaginal discharge, thoughts of

## 2025-01-19 NOTE — CARE COORDINATION
Chart reviewed.  Demographics verified with patient.  Family has selected [facility] as pediatrician for infant.     Patient reports a history of anxiety/depression. Patient is not currently receiving counseling services or taking medications for control of anxiety or depression symptoms. Patient reports feeling well and denies the need for further intervention at this time.     Reports strong support system at home with friends and family, in addition to spouse, Calin Hester.     Patient given informational packet on  mood & anxiety disorders (resources/education).   provided education on [facility] Postpartum  Home Visit Program.  Family was undecided on need for home visit.  No referral will be made at this time.  Family has this 's contact information should they decide to participate in program.    Family denies any additional needs from case management at this time.  Family has 's contact information should any needs/questions arise.    Mariel Narayan RN, BSN  Case Management  [facility]

## 2025-01-19 NOTE — LACTATION NOTE
This note was copied from a baby's chart.  Observed at breast in cross cradle on L.  Baby fed fairly well, assisted with rolling baby in for maintaining deeper latch.  Demonstrated manual lip flange.  Encouraged frequent feeding and watch output.  Demoed use of [employer]ra.  Discussed insurance pumping.  Going to Ped tomorrow.      Mom and baby are going home today.  Continue to offer the breast without restriction.  Mom's milk should be fully in over the next few days.  Reviewed engorgement precautions.  Hand Expression has been demoed and written hand-out reviewed.  As milk comes in baby will be more alert at the breast and swallows will be more obvious.  Breasts may feel softer once baby has finished nursing.  Baby should be back to birth weight by 2 weeks of age.  And then gain on average 1 oz per day for the next 2-3 months.  Reviewed babies should be exclusively breastfeeding for the first 6 months and that breastfeeding should continue after introduction of appropriate complimentary foods after 6 months.  Initial output should be at least 1 wet and 1 bowel movement for each day old baby is.  By day 5-7 once milk is fully in baby will consistently have 6 or more soaking wet diapers and about 4 bowel movement.  Some babies have a bowel movement with every feeding and some have 1-3 large bowel movements each day.  Inadequate output may indicate inadequate feedings and should be reported to your Pediatrician.  Bowel habits may change as baby gets older.  Encouraged follow-up at Pediatrician in 1-2 days, no later than 1 week of age.  Call [facility] for any questions as needed or to set up an OP visit.  OP phone calls are returned within 24 hours. Community Breastfeeding Resource List given.           caffeine

## 2025-01-19 NOTE — PROGRESS NOTES
[facility]  [address], [place] [address]  [phone]    Ramos Smith MD, FACOG  [facility]    Patient is S/P vaginal delivery at 39 4/7 weeks, term IOL. No complaints today.  Lochia < menses.  No GI/ issues.  No F/C.    VITALS  Patient Vitals for the past 24 hrs:   BP Temp Temp src Pulse Resp SpO2   25 0721 119/73 97.7 °F (36.5 °C) Oral 85 18 97 %   25 2232 124/82 97.9 °F (36.6 °C) Oral (!) 102 16 97 %   25 1728 121/86 98.7 °F (37.1 °C) Oral 98 18 99 %        CV - RRR  LUNGS - CTA bilaterally  ABD - soft, approp tend, FF below umbilicus  EXT - tr edema bilaterally          Labs:  No results found for this or any previous visit (from the past 24 hour(s)).       PPD #2      Pt is breast feeding. No issues or complaints today.  Stable.  Routine PP instructions      Ramos Smith MD  10:31 AM  25

## 2025-01-20 NOTE — DISCHARGE SUMMARY
[facility]  [address], [place] [address]  [phone]    Ramos Smith MD, FACOG  [facility]  Date of Admission:  2025  6:30 PM  Date of Discharge:  2025  3:57 PM    Patient Active Problem List   Diagnosis    Hyperemesis affecting pregnancy, antepartum    High-risk pregnancy in third trimester    History of miscarriage, currently pregnant, third trimester    Anxiety and Depression affecting pregnancy    Cystic fibrosis carrier    Single episode of elevated blood pressure    Nausea and vomiting in pregnancy    Encounter for elective induction of labor    Normal delivery at term        Pau Hester 24 y.o.  presented at 39w4d for term induction.  Pt had  without incident.  See delivery note for all delivery details.  Pt's PP course was uneventful.  On day of D/C, she was ambulating well, afebrile, with lochia < menses.  She was discharged home with medications as below.  Pt was breast feeding on discharge.  Routine PP instructions given to patient.  She is to follow up with [facility] in 6 weeks for PP exam.       Medication List        START taking these medications      docusate 100 MG Caps  Commonly known as: COLACE, DULCOLAX  Take 100 mg by mouth 2 times daily     HYDROcodone-acetaminophen 5-325 MG per tablet  Commonly known as: Norco  Take 1 tablet by mouth every 6 hours as needed for Pain for up to 5 days. Max Daily Amount: 4 tablets     ibuprofen 600 MG tablet  Commonly known as: ADVIL;MOTRIN  Take 1 tablet by mouth every 6 hours as needed for Pain            CONTINUE taking these medications      famotidine 20 MG tablet  Commonly known as: PEPCID     IRON PO     One-A-Day Womens Prenatal 28-0.8 & 440 MG Misc  Take 1 tablet by mouth daily            STOP taking these medications      acetaminophen 325 MG tablet  Commonly known as: TYLENOL     calcium carbonate 500 MG chewable tablet  Commonly known as: TUMS     OMEGA-3 + D PO     ondansetron 4 MG

## 2025-01-22 LAB
ABO + RH BLD: NORMAL
BLOOD GROUP ANTIBODIES SERPL: NORMAL
BLOOD GROUP ANTIBODIES SERPL: NORMAL
SPECIMEN EXP DATE BLD: NORMAL
WEAK D AG RBC QL: NORMAL

## 2025-02-03 ENCOUNTER — PATIENT MESSAGE (OUTPATIENT)
Dept: OBGYN CLINIC | Age: 25
End: 2025-02-03

## 2025-02-05 PROBLEM — R03.0 SINGLE EPISODE OF ELEVATED BLOOD PRESSURE: Status: RESOLVED | Noted: 2024-10-31 | Resolved: 2025-02-05

## 2025-02-05 PROBLEM — O21.0 HYPEREMESIS AFFECTING PREGNANCY, ANTEPARTUM: Status: RESOLVED | Noted: 2024-07-16 | Resolved: 2025-02-05

## 2025-02-05 PROBLEM — Z34.90 ENCOUNTER FOR ELECTIVE INDUCTION OF LABOR: Status: RESOLVED | Noted: 2025-01-16 | Resolved: 2025-02-05

## 2025-02-05 PROBLEM — O21.9 NAUSEA AND VOMITING IN PREGNANCY: Status: RESOLVED | Noted: 2024-12-24 | Resolved: 2025-02-05

## 2025-02-05 PROBLEM — O09.293 HISTORY OF MISCARRIAGE, CURRENTLY PREGNANT, THIRD TRIMESTER: Status: RESOLVED | Noted: 2024-07-16 | Resolved: 2025-02-05

## 2025-02-05 NOTE — PROGRESS NOTES
2025    Pau Hester  2000  Age: 24 y.o.       female 3 weeks s/p vag delivery by Sarah. C/o vag odor and ?gaping at perineal laceration repair. The odor was mild and is better. No fever or pain.   Delivery c/b shoulder dystocia. Son weighed 8#13.  2nd degree tear with vicryl.    Hasn't had intercourse since delivery. Pregnancy and delivery were complicated by hyperemesis, CF carrier.   Patient feels comfortable with caring for the baby. Breastfeeding?- yes. Plans condoms for birth control.    Last pap: NL cytology, no ecc present,   History of GDM - no    /70   Temp 97.1 °F (36.2 °C) (Temporal)   Ht 1.613 m (5' 3.5\")   Wt 69.9 kg (154 lb)   Breastfeeding Yes   BMI 26.85 kg/m²     Affect appropriate and bright   Pelvic - NL genitalia. Tear is healing great. Some induration as expected and very small area granulation tissue. No sign infxn. No abnl d/c. No odor  Neuro grossly NL    Pau \"Julien\" was seen today for postpartum care.    Diagnoses and all orders for this visit:    Encounter for postpartum care and examination after delivery    Vaginal odor    Pt reassured. Rtn 3wks for PP appt  Keep using sitz baths.     Debra Turpin MD

## 2025-02-07 ENCOUNTER — POSTPARTUM VISIT (OUTPATIENT)
Dept: OBGYN CLINIC | Age: 25
End: 2025-02-07
Payer: MEDICAID

## 2025-02-07 VITALS
HEIGHT: 64 IN | BODY MASS INDEX: 26.29 KG/M2 | SYSTOLIC BLOOD PRESSURE: 122 MMHG | WEIGHT: 154 LBS | TEMPERATURE: 97.1 F | DIASTOLIC BLOOD PRESSURE: 70 MMHG

## 2025-02-07 DIAGNOSIS — N89.8 VAGINAL ODOR: ICD-10-CM

## 2025-02-07 PROCEDURE — 99212 OFFICE O/P EST SF 10 MIN: CPT | Performed by: OBSTETRICS & GYNECOLOGY

## 2025-02-26 NOTE — PROGRESS NOTES
2025    Pau Hester  2000  Age: 24 y.o.       female 6 weeks s/p vag delivery by Sarah. No complaints. Hasn't had intercourse since delivery. Pregnancy and delivery were complicated by hyperemesis, CF carrier, shoulder dystocia at delivery.   Patient feels comfortable with caring for the baby. Breastfeeding?- yes. Plans condoms for birth control.    Last pap: NL cytology, no ecc present,   History of GDM - no    /74   Ht 1.613 m (5' 3.5\")   Wt 69.9 kg (154 lb)   Breastfeeding Yes   BMI 26.85 kg/m²     Affect appropriate and bright  Neck supple w/o thyromegaly  Breasts w/o masses or axillary lymphadenopathy  Abdomen soft and nontender. No masses noted.   Normal externalia genitalia.  2 areas of granulation tissue at the perineum. Touched with ag nitrate.  Neuro grossly NL    Pau \"Julien\" was seen today for postpartum care.    Diagnoses and all orders for this visit:    Encounter for postpartum care and examination after delivery    Family planning counseling    Granulation tissue      Cruz 1-2wks  Pt to let us know if she wants rx for BC    Debra Turpin MD

## 2025-02-28 ENCOUNTER — POSTPARTUM VISIT (OUTPATIENT)
Dept: OBGYN CLINIC | Age: 25
End: 2025-02-28

## 2025-02-28 VITALS
WEIGHT: 154 LBS | DIASTOLIC BLOOD PRESSURE: 74 MMHG | BODY MASS INDEX: 26.29 KG/M2 | SYSTOLIC BLOOD PRESSURE: 112 MMHG | HEIGHT: 64 IN

## 2025-02-28 DIAGNOSIS — Z30.09 FAMILY PLANNING COUNSELING: ICD-10-CM

## 2025-02-28 DIAGNOSIS — L92.9 GRANULATION TISSUE: ICD-10-CM

## 2025-03-24 NOTE — PROGRESS NOTES
3/24/2025  PT NOT SEEN. THE BELOW IS PRECHARTING      Pau Hester  : 2000  24 y.o.      HPI: here for russ granulation tissue at site of healing perineal tear after vag delivery.     Exam:  There were no vitals taken for this visit.    There is no height or weight on file to calculate BMI.    Pt in no distress. Alert and oriented x3. Affect bright.  Well developed, well nourished.  HEENT: normocephalic, atraumatic. Sclerae nonicteric.  Pelvic:   Neuro: grossly intact    Diagnoses and all orders for this visit:    Granulation tissue    Encounter for postpartum care and examination after delivery         Debra Turpin MD

## 2025-03-25 ENCOUNTER — OFFICE VISIT (OUTPATIENT)
Dept: OBGYN CLINIC | Age: 25
End: 2025-03-25

## 2025-03-25 VITALS
WEIGHT: 156 LBS | DIASTOLIC BLOOD PRESSURE: 76 MMHG | BODY MASS INDEX: 26.63 KG/M2 | HEIGHT: 64 IN | SYSTOLIC BLOOD PRESSURE: 130 MMHG

## 2025-03-25 DIAGNOSIS — L92.9 GRANULATION TISSUE: Primary | ICD-10-CM

## 2025-03-27 ENCOUNTER — OFFICE VISIT (OUTPATIENT)
Dept: OBGYN CLINIC | Age: 25
End: 2025-03-27
Payer: MEDICAID

## 2025-03-27 VITALS
HEIGHT: 64 IN | SYSTOLIC BLOOD PRESSURE: 120 MMHG | BODY MASS INDEX: 26.8 KG/M2 | WEIGHT: 157 LBS | DIASTOLIC BLOOD PRESSURE: 72 MMHG

## 2025-03-27 DIAGNOSIS — L92.9 GRANULATION TISSUE AT OBSTETRICAL LACERATION SITE: ICD-10-CM

## 2025-03-27 DIAGNOSIS — R10.2 PERINEAL PAIN: ICD-10-CM

## 2025-03-27 PROCEDURE — 99213 OFFICE O/P EST LOW 20 MIN: CPT | Performed by: OBSTETRICS & GYNECOLOGY

## 2025-03-27 RX ORDER — CONJUGATED ESTROGENS 0.62 MG/G
0.5 CREAM VAGINAL DAILY
Qty: 30 G | Refills: 0 | Status: SHIPPED | OUTPATIENT
Start: 2025-03-27

## 2025-03-27 NOTE — PROGRESS NOTES
3/27/2025      Pau Hester  : 2000  24 y.o.      HPI: here for russ granulation tissue at site of healing perineal tear after vag delivery.     Exam:  /72   Ht 1.613 m (5' 3.5\")   Wt 71.2 kg (157 lb)   Breastfeeding Yes   BMI 27.38 kg/m²     Body mass index is 27.38 kg/m².    Pt in no distress. Alert and oriented x3. Affect bright.  Well developed, well nourished.  HEENT: normocephalic, atraumatic. Sclerae nonicteric.  Pelvic: Granulation tissue has resolved except for tiny area. No active bleeding.  Neuro: grossly intact    1. Obstetrical laceration    2. Granulation tissue at obstetrical laceration site    3. Perineal pain      Orders Placed This Encounter   Procedures    [facility] - Referral to Physical Therapy - Pelvic Health     Orders Placed This Encounter   Medications    estrogens conjugated (PREMARIN) 0.625 MG/GM CREA vaginal cream     Sig: Place 0.5 g vaginally daily     Dispense:  30 g     Refill:  0     Discussed her findings on exam - recommend 6-12 wks of topical estrogen to the area of granulation tissue that has been mostly excised. Unlikely to be an issue in the long run. Pt is concerned she will have perineal pain from this - she has this pain now and has been reassured that it will likely continue to improve but option to refer to pelvic floor PT discussed and pt prefers this. Will place order.    Yaa Rubin MD

## 2025-04-14 ENCOUNTER — OFFICE VISIT (OUTPATIENT)
Dept: OBGYN CLINIC | Age: 25
End: 2025-04-14
Payer: MEDICAID

## 2025-04-14 VITALS
DIASTOLIC BLOOD PRESSURE: 70 MMHG | WEIGHT: 155 LBS | HEIGHT: 64 IN | BODY MASS INDEX: 26.46 KG/M2 | SYSTOLIC BLOOD PRESSURE: 110 MMHG

## 2025-04-14 DIAGNOSIS — L92.9 GRANULATION TISSUE AT OBSTETRICAL LACERATION SITE: Primary | ICD-10-CM

## 2025-04-14 DIAGNOSIS — R10.2 PERINEAL PAIN: ICD-10-CM

## 2025-04-14 PROCEDURE — 99213 OFFICE O/P EST LOW 20 MIN: CPT | Performed by: OBSTETRICS & GYNECOLOGY

## 2025-04-14 NOTE — PROGRESS NOTES
2025      Pau Hester  : 2000  24 y.o.      HPI: here for russ granulation tissue at site of healing perineal tear after vag delivery.     Exam:  /70   Ht 1.626 m (5' 4\")   Wt 70.3 kg (155 lb)   Breastfeeding Yes   BMI 26.61 kg/m²     Body mass index is 26.61 kg/m².    Pt in no distress. Alert and oriented x3. Affect bright.  Well developed, well nourished.  HEENT: normocephalic, atraumatic. Sclerae nonicteric.  Pelvic: Granulation tissue has resolved except for an even smaller area. Great improvement and almost full resolution. No active bleeding.  Neuro: grossly intact    1. Granulation tissue at obstetrical laceration site    2. Perineal pain        No orders of the defined types were placed in this encounter.    No orders of the defined types were placed in this encounter.    Discussed her findings on exam - recommend finishing out the 6-12 wks of topical estrogen to the area of granulation tissue that has been mostly excised. Unlikely to be an issue in the long run. Pt is concerned she will have perineal pain from this - she has this pain now and has been reassured that it will likely continue to improve but pt agreed with referral to pelvic floor PT..    Yaa Rubin MD